# Patient Record
Sex: MALE | Race: WHITE | NOT HISPANIC OR LATINO | Employment: FULL TIME | URBAN - METROPOLITAN AREA
[De-identification: names, ages, dates, MRNs, and addresses within clinical notes are randomized per-mention and may not be internally consistent; named-entity substitution may affect disease eponyms.]

---

## 2017-06-07 ENCOUNTER — OFFICE VISIT - HEALTHEAST (OUTPATIENT)
Dept: INTERNAL MEDICINE | Facility: CLINIC | Age: 52
End: 2017-06-07

## 2017-06-07 DIAGNOSIS — R53.83 FATIGUE: ICD-10-CM

## 2017-06-07 DIAGNOSIS — E11.9 T2DM (TYPE 2 DIABETES MELLITUS) (H): ICD-10-CM

## 2017-06-07 LAB — HBA1C MFR BLD: 7.3 % (ref 3.5–6)

## 2017-06-08 ENCOUNTER — COMMUNICATION - HEALTHEAST (OUTPATIENT)
Dept: INTERNAL MEDICINE | Facility: CLINIC | Age: 52
End: 2017-06-08

## 2017-06-08 DIAGNOSIS — E11.9 TYPE 2 DIABETES MELLITUS NOT AT GOAL (H): ICD-10-CM

## 2017-09-12 ENCOUNTER — OFFICE VISIT - HEALTHEAST (OUTPATIENT)
Dept: INTERNAL MEDICINE | Facility: CLINIC | Age: 52
End: 2017-09-12

## 2017-09-12 DIAGNOSIS — M70.61 TROCHANTERIC BURSITIS OF RIGHT HIP: ICD-10-CM

## 2017-09-12 DIAGNOSIS — M25.551 RIGHT HIP PAIN: ICD-10-CM

## 2017-11-20 ENCOUNTER — RECORDS - HEALTHEAST (OUTPATIENT)
Dept: ADMINISTRATIVE | Facility: OTHER | Age: 52
End: 2017-11-20

## 2018-03-12 ENCOUNTER — OFFICE VISIT - HEALTHEAST (OUTPATIENT)
Dept: FAMILY MEDICINE | Facility: CLINIC | Age: 53
End: 2018-03-12

## 2018-03-12 DIAGNOSIS — M75.51 SUBACROMIAL BURSITIS OF RIGHT SHOULDER JOINT: ICD-10-CM

## 2018-05-16 ENCOUNTER — OFFICE VISIT - HEALTHEAST (OUTPATIENT)
Dept: FAMILY MEDICINE | Facility: CLINIC | Age: 53
End: 2018-05-16

## 2018-05-16 DIAGNOSIS — M25.562 LEFT KNEE PAIN: ICD-10-CM

## 2018-05-23 ENCOUNTER — RECORDS - HEALTHEAST (OUTPATIENT)
Dept: ADMINISTRATIVE | Facility: OTHER | Age: 53
End: 2018-05-23

## 2018-05-30 ENCOUNTER — RECORDS - HEALTHEAST (OUTPATIENT)
Dept: ADMINISTRATIVE | Facility: OTHER | Age: 53
End: 2018-05-30

## 2018-06-05 ENCOUNTER — OFFICE VISIT - HEALTHEAST (OUTPATIENT)
Dept: INTERNAL MEDICINE | Facility: CLINIC | Age: 53
End: 2018-06-05

## 2018-06-05 DIAGNOSIS — Z01.818 PRE-OP EXAMINATION: ICD-10-CM

## 2018-06-05 DIAGNOSIS — M23.201 OLD TEAR OF LATERAL MENISCUS OF LEFT KNEE, UNSPECIFIED TEAR TYPE: ICD-10-CM

## 2018-06-05 DIAGNOSIS — E11.9 TYPE 2 DIABETES MELLITUS NOT AT GOAL (H): ICD-10-CM

## 2018-06-05 LAB
ATRIAL RATE - MUSE: 50 BPM
CREAT UR-MCNC: 107.6 MG/DL
DIASTOLIC BLOOD PRESSURE - MUSE: NORMAL MMHG
HBA1C MFR BLD: 5.5 % (ref 3.5–6)
INTERPRETATION ECG - MUSE: NORMAL
MICROALBUMIN UR-MCNC: <0.5 MG/DL (ref 0–1.99)
MICROALBUMIN/CREAT UR: NORMAL MG/G
P AXIS - MUSE: 28 DEGREES
PR INTERVAL - MUSE: 130 MS
QRS DURATION - MUSE: 100 MS
QT - MUSE: 434 MS
QTC - MUSE: 395 MS
R AXIS - MUSE: 18 DEGREES
SYSTOLIC BLOOD PRESSURE - MUSE: NORMAL MMHG
T AXIS - MUSE: 17 DEGREES
VENTRICULAR RATE- MUSE: 50 BPM

## 2018-06-05 ASSESSMENT — MIFFLIN-ST. JEOR: SCORE: 1765.23

## 2018-06-07 ENCOUNTER — RECORDS - HEALTHEAST (OUTPATIENT)
Dept: ADMINISTRATIVE | Facility: OTHER | Age: 53
End: 2018-06-07

## 2018-06-21 ENCOUNTER — RECORDS - HEALTHEAST (OUTPATIENT)
Dept: ADMINISTRATIVE | Facility: OTHER | Age: 53
End: 2018-06-21

## 2018-07-19 ENCOUNTER — RECORDS - HEALTHEAST (OUTPATIENT)
Dept: ADMINISTRATIVE | Facility: OTHER | Age: 53
End: 2018-07-19

## 2018-09-20 ENCOUNTER — RECORDS - HEALTHEAST (OUTPATIENT)
Dept: ADMINISTRATIVE | Facility: OTHER | Age: 53
End: 2018-09-20

## 2018-10-22 ENCOUNTER — OFFICE VISIT - HEALTHEAST (OUTPATIENT)
Dept: FAMILY MEDICINE | Facility: CLINIC | Age: 53
End: 2018-10-22

## 2018-10-22 DIAGNOSIS — R22.2 MASS ON BACK: ICD-10-CM

## 2018-10-30 ENCOUNTER — OFFICE VISIT - HEALTHEAST (OUTPATIENT)
Dept: FAMILY MEDICINE | Facility: CLINIC | Age: 53
End: 2018-10-30

## 2018-10-30 DIAGNOSIS — L72.3 SEBACEOUS CYST: ICD-10-CM

## 2018-10-30 DIAGNOSIS — E11.9 TYPE 2 DIABETES MELLITUS WITHOUT COMPLICATION, WITHOUT LONG-TERM CURRENT USE OF INSULIN (H): ICD-10-CM

## 2018-11-06 ENCOUNTER — AMBULATORY - HEALTHEAST (OUTPATIENT)
Dept: FAMILY MEDICINE | Facility: CLINIC | Age: 53
End: 2018-11-06

## 2018-11-06 DIAGNOSIS — L72.3 SEBACEOUS CYST: ICD-10-CM

## 2018-11-07 ENCOUNTER — COMMUNICATION - HEALTHEAST (OUTPATIENT)
Dept: FAMILY MEDICINE | Facility: CLINIC | Age: 53
End: 2018-11-07

## 2018-11-08 LAB — BACTERIA SPEC CULT: NO GROWTH

## 2018-11-13 ENCOUNTER — OFFICE VISIT - HEALTHEAST (OUTPATIENT)
Dept: FAMILY MEDICINE | Facility: CLINIC | Age: 53
End: 2018-11-13

## 2018-11-13 ENCOUNTER — COMMUNICATION - HEALTHEAST (OUTPATIENT)
Dept: FAMILY MEDICINE | Facility: CLINIC | Age: 53
End: 2018-11-13

## 2018-11-13 DIAGNOSIS — M89.8X1 CHRONIC SCAPULAR PAIN: ICD-10-CM

## 2018-11-13 DIAGNOSIS — G89.29 CHRONIC SCAPULAR PAIN: ICD-10-CM

## 2018-11-13 DIAGNOSIS — R07.89 CHEST WALL PAIN: ICD-10-CM

## 2018-11-15 ENCOUNTER — COMMUNICATION - HEALTHEAST (OUTPATIENT)
Dept: FAMILY MEDICINE | Facility: CLINIC | Age: 53
End: 2018-11-15

## 2019-02-19 ENCOUNTER — RECORDS - HEALTHEAST (OUTPATIENT)
Dept: ADMINISTRATIVE | Facility: OTHER | Age: 54
End: 2019-02-19

## 2019-03-27 ENCOUNTER — RECORDS - HEALTHEAST (OUTPATIENT)
Dept: ADMINISTRATIVE | Facility: OTHER | Age: 54
End: 2019-03-27

## 2019-04-11 ENCOUNTER — OFFICE VISIT - HEALTHEAST (OUTPATIENT)
Dept: FAMILY MEDICINE | Facility: CLINIC | Age: 54
End: 2019-04-11

## 2019-04-11 ENCOUNTER — COMMUNICATION - HEALTHEAST (OUTPATIENT)
Dept: INTERNAL MEDICINE | Facility: CLINIC | Age: 54
End: 2019-04-11

## 2019-04-11 DIAGNOSIS — E11.9 TYPE 2 DIABETES MELLITUS WITHOUT COMPLICATION, WITHOUT LONG-TERM CURRENT USE OF INSULIN (H): ICD-10-CM

## 2019-04-11 DIAGNOSIS — Z01.818 PRE-OP EXAM: ICD-10-CM

## 2019-04-11 DIAGNOSIS — M25.562 LEFT KNEE PAIN, UNSPECIFIED CHRONICITY: ICD-10-CM

## 2019-04-11 LAB
ANION GAP SERPL CALCULATED.3IONS-SCNC: 11 MMOL/L (ref 5–18)
BUN SERPL-MCNC: 24 MG/DL (ref 8–22)
CALCIUM SERPL-MCNC: 9.5 MG/DL (ref 8.5–10.5)
CHLORIDE BLD-SCNC: 108 MMOL/L (ref 98–107)
CO2 SERPL-SCNC: 24 MMOL/L (ref 22–31)
CREAT SERPL-MCNC: 1.03 MG/DL (ref 0.7–1.3)
CREAT UR-MCNC: 127.3 MG/DL
ERYTHROCYTE [DISTWIDTH] IN BLOOD BY AUTOMATED COUNT: 10.2 % (ref 11–14.5)
GFR SERPL CREATININE-BSD FRML MDRD: >60 ML/MIN/1.73M2
GLUCOSE BLD-MCNC: 74 MG/DL (ref 70–125)
HBA1C MFR BLD: 5.1 % (ref 3.5–6)
HCT VFR BLD AUTO: 44.8 % (ref 40–54)
HGB BLD-MCNC: 15.9 G/DL (ref 14–18)
MCH RBC QN AUTO: 32.6 PG (ref 27–34)
MCHC RBC AUTO-ENTMCNC: 35.4 G/DL (ref 32–36)
MCV RBC AUTO: 92 FL (ref 80–100)
MICROALBUMIN UR-MCNC: 0.61 MG/DL (ref 0–1.99)
MICROALBUMIN/CREAT UR: 4.8 MG/G
PLATELET # BLD AUTO: 196 THOU/UL (ref 140–440)
PMV BLD AUTO: 8.6 FL (ref 7–10)
POTASSIUM BLD-SCNC: 4.1 MMOL/L (ref 3.5–5)
RBC # BLD AUTO: 4.86 MILL/UL (ref 4.4–6.2)
SODIUM SERPL-SCNC: 143 MMOL/L (ref 136–145)
WBC: 12.4 THOU/UL (ref 4–11)

## 2019-04-11 ASSESSMENT — MIFFLIN-ST. JEOR: SCORE: 1694.92

## 2019-04-12 ENCOUNTER — COMMUNICATION - HEALTHEAST (OUTPATIENT)
Dept: FAMILY MEDICINE | Facility: CLINIC | Age: 54
End: 2019-04-12

## 2019-05-02 ENCOUNTER — RECORDS - HEALTHEAST (OUTPATIENT)
Dept: ADMINISTRATIVE | Facility: OTHER | Age: 54
End: 2019-05-02

## 2019-05-16 ENCOUNTER — RECORDS - HEALTHEAST (OUTPATIENT)
Dept: ADMINISTRATIVE | Facility: OTHER | Age: 54
End: 2019-05-16

## 2019-07-09 ENCOUNTER — RECORDS - HEALTHEAST (OUTPATIENT)
Dept: ADMINISTRATIVE | Facility: OTHER | Age: 54
End: 2019-07-09

## 2019-09-24 ENCOUNTER — RECORDS - HEALTHEAST (OUTPATIENT)
Dept: ADMINISTRATIVE | Facility: OTHER | Age: 54
End: 2019-09-24

## 2019-10-15 ENCOUNTER — RECORDS - HEALTHEAST (OUTPATIENT)
Dept: ADMINISTRATIVE | Facility: OTHER | Age: 54
End: 2019-10-15

## 2019-10-23 ENCOUNTER — SURGERY - HEALTHEAST (OUTPATIENT)
Dept: CARDIOLOGY | Facility: CLINIC | Age: 54
End: 2019-10-23

## 2019-10-23 ASSESSMENT — MIFFLIN-ST. JEOR
SCORE: 1765.23
SCORE: 1792.44

## 2019-10-25 ASSESSMENT — MIFFLIN-ST. JEOR: SCORE: 1758.88

## 2019-10-28 ENCOUNTER — COMMUNICATION - HEALTHEAST (OUTPATIENT)
Dept: CARDIOLOGY | Facility: CLINIC | Age: 54
End: 2019-10-28

## 2019-10-28 DIAGNOSIS — I40.1 ACUTE IDIOPATHIC MYOCARDITIS: ICD-10-CM

## 2019-10-28 DIAGNOSIS — I30.0 ACUTE IDIOPATHIC PERICARDITIS: ICD-10-CM

## 2019-11-27 ENCOUNTER — HOSPITAL ENCOUNTER (OUTPATIENT)
Dept: MRI IMAGING | Facility: HOSPITAL | Age: 54
Discharge: HOME | End: 2019-11-27
Attending: INTERNAL MEDICINE

## 2019-11-27 DIAGNOSIS — I40.1 ACUTE IDIOPATHIC MYOCARDITIS: ICD-10-CM

## 2019-11-27 DIAGNOSIS — I30.0 ACUTE IDIOPATHIC PERICARDITIS: ICD-10-CM

## 2019-11-29 LAB
CCTA EJECTION FRACTION: 65 %
CCTA INTERVENTRICULAR SETPUM: 0.9 CM (ref 0.6–1.1)
CCTA LEFT INTERNAL DIMENSION IN SYSTOLE: 3.4 CM (ref 2.1–4)
CCTA LEFT VENTRICULAR INTERNAL DIMENSION IN DIASTOLE: 5.5 CM (ref 3.5–6)
CCTA LEFT VENTRICULAR MASS: 172.72 G
CCTA POSTERIOR WALL: 0.8 CM (ref 0.6–1.1)
MR CARDIAC LEFT VENTRIAL CARDIAC INDEX: 3.5 L/MIN/M2 (ref 1.7–4.2)
MR CARDIAC LEFT VENTRICAL CARDIAC OUTPUT: 7.3 L/MIN (ref 2.8–8.8)
MR CARDIAC LEFT VENTRICULAR DIASTOLIC VOLUME INDEX: 84.85 ML/M2 (ref 47–92)
MR CARDIAC LEFT VENTRICULAR MASS INDEX: 63.4 G/M2 (ref 70–113)
MR CARDIAC LEFT VENTRICULAR MASS: 133 G (ref 118–238)
MR CARDIAC LEFT VENTRICULAR STROKE VOLUME INDEX: 58.15 ML/M2 (ref 32–62)
MR CARDIAC LEFT VENTRICULAR SYSTOLIC VOLUME INDEX: 26.69 ML/M2 (ref 13–30)
MR EJECTION FRACTION: 68.54 %
MR HEIGHT: 1.78 M
MR LEFT VENTRICULAR DYSTOLIC VOLUMEN: 178 ML (ref 77–195)
MR LEFT VENTRICULAR STROKE VOLUMEN: 122 ML (ref 51–133)
MR LEFT VENTRICULAR SYSTOLIC VOLUME: 56 ML (ref 19–72)
MR WEIGHT: 91.8 KG

## 2019-12-02 ENCOUNTER — COMMUNICATION - HEALTHEAST (OUTPATIENT)
Dept: CARDIOLOGY | Facility: CLINIC | Age: 54
End: 2019-12-02

## 2019-12-06 ENCOUNTER — OFFICE VISIT - HEALTHEAST (OUTPATIENT)
Dept: CARDIOLOGY | Facility: CLINIC | Age: 54
End: 2019-12-06

## 2019-12-06 DIAGNOSIS — I31.9 PERICARDITIS: ICD-10-CM

## 2019-12-06 RX ORDER — IBUPROFEN 200 MG
800 TABLET ORAL EVERY 6 HOURS PRN
Status: SHIPPED | COMMUNITY
Start: 2019-12-06

## 2019-12-06 ASSESSMENT — MIFFLIN-ST. JEOR: SCORE: 1766.59

## 2019-12-09 ENCOUNTER — COMMUNICATION - HEALTHEAST (OUTPATIENT)
Dept: FAMILY MEDICINE | Facility: CLINIC | Age: 54
End: 2019-12-09

## 2019-12-11 ENCOUNTER — RECORDS - HEALTHEAST (OUTPATIENT)
Dept: ADMINISTRATIVE | Facility: OTHER | Age: 54
End: 2019-12-11

## 2021-04-27 ENCOUNTER — RECORDS - HEALTHEAST (OUTPATIENT)
Dept: LAB | Facility: CLINIC | Age: 56
End: 2021-04-27

## 2021-04-27 LAB
ALBUMIN SERPL-MCNC: 4.2 G/DL (ref 3.5–5)
ALP SERPL-CCNC: 72 U/L (ref 45–120)
ALT SERPL W P-5'-P-CCNC: 25 U/L (ref 0–45)
ANION GAP SERPL CALCULATED.3IONS-SCNC: 7 MMOL/L (ref 5–18)
AST SERPL W P-5'-P-CCNC: 17 U/L (ref 0–40)
BILIRUB SERPL-MCNC: 0.4 MG/DL (ref 0–1)
BUN SERPL-MCNC: 16 MG/DL (ref 8–22)
CALCIUM SERPL-MCNC: 9.3 MG/DL (ref 8.5–10.5)
CHLORIDE BLD-SCNC: 106 MMOL/L (ref 98–107)
CHOLEST SERPL-MCNC: 215 MG/DL
CO2 SERPL-SCNC: 26 MMOL/L (ref 22–31)
CREAT SERPL-MCNC: 1.04 MG/DL (ref 0.7–1.3)
FASTING STATUS PATIENT QL REPORTED: ABNORMAL
GFR SERPL CREATININE-BSD FRML MDRD: >60 ML/MIN/1.73M2
GLUCOSE BLD-MCNC: 103 MG/DL (ref 70–125)
HDLC SERPL-MCNC: 31 MG/DL
LDLC SERPL CALC-MCNC: 122 MG/DL
POTASSIUM BLD-SCNC: 4 MMOL/L (ref 3.5–5)
PROT SERPL-MCNC: 7 G/DL (ref 6–8)
SODIUM SERPL-SCNC: 139 MMOL/L (ref 136–145)
TRIGL SERPL-MCNC: 310 MG/DL

## 2021-05-25 ENCOUNTER — RECORDS - HEALTHEAST (OUTPATIENT)
Dept: ADMINISTRATIVE | Facility: CLINIC | Age: 56
End: 2021-05-25

## 2021-05-27 NOTE — PROGRESS NOTES
Preoperative Exam    Scheduled Procedure: arthroscopic left knee.  Surgery Date:  05/0282019  Surgery Location: [facility]     Surgeon:  Dr. Lopez    Assessment/Plan:     1. Pre-op exam    - Basic Metabolic Panel; Future  - HM2(CBC w/o Differential); Future  - Glycosylated Hemoglobin A1c  - Microalbumin, Random Urine  - Basic Metabolic Panel  - HM2(CBC w/o Differential)    2. Type 2 diabetes mellitus without complication, without long-term current use of insulin (H)    - Basic Metabolic Panel; Future  - HM2(CBC w/o Differential); Future  - Glycosylated Hemoglobin A1c  - Microalbumin, Random Urine  - Basic Metabolic Panel  - HM2(CBC w/o Differential)    3. Left knee pain, unspecified chronicity    - HM2(CBC w/o Differential); Future  - HM2(CBC w/o Differential)     Surgical Procedure Risk: Low (reported cardiac risk generally < 1%)  Have you had prior anesthesia?: Yes  Have you or any family members had a previous anesthesia reaction:  No  Do you or any family members have a history of a clotting or bleeding disorder?: No  Cardiac Risk Assessment: no increased risk for major cardiac complications    Patient approved for surgery with general or local anesthesia.    Please Note:  none    Functional Status: Independent  Patient plans to recover at home with family.     Past Surgical History:   Procedure Laterality Date     APPENDECTOMY  2012     NE PART REMOVAL COLON W ANASTOMOSIS      Description: Partial Colectomy;  Recorded: 11/06/2013;  Comments: SIGMOID COLECTOMY, OCT 2013     Spontaneous Pnemothroax  1996     Subjective:      Osmel Garay is a 54 y.o. male who presents for a preoperative consultation.  Osmel has had ongoing left knee pain since a slip on the ice this winter.  He tried a steroid injection in the knee but this only lasted a few weeks.  He walks at least 20,000 steps a day between exercise and his 2 jobs.  He does this to keep his blood sugar under good control, he is not currently on any  medications for his diabetes.  Lab Results   Component Value Date    HGBA1C 5.1 04/11/2019     He has been able to lose 50 pounds and his A1c is gone into the normal range.    All other systems reviewed and are negative, other than those listed in the HPI.    Pertinent History  Do you have difficulty breathing or chest pain after walking up a flight of stairs: No  History of obstructive sleep apnea: No  Steroid use in the last 6 months: No  Frequent Aspirin/NSAID use: No  Prior Blood Transfusion: No  Prior Blood Transfusion Reaction: No  If for some reason prior to, during or after the procedure, if it is medically indicated, would you be willing to have a blood transfusion?:  The patient REFUSES transfusion.    Current Outpatient Medications   Medication Sig Dispense Refill     ibuprofen (ADVIL,MOTRIN) 200 MG tablet Take 800 mg by mouth every 8 (eight) hours as needed for pain.       No current facility-administered medications for this visit.         No Known Allergies    Patient Active Problem List   Diagnosis     Seborrheic Keratosis     Shoulder Strain     Joint Pain, Localized In The Shoulder     Esophageal Reflux     Diverticulitis of colon     Attention-deficit Hyperactivity Disorder     Plantar Fasciitis     DM - Type 2 diabetes      Inguinal hernia, right - patient reports diagnosis in ER in March, 2015     Black stools - possible melena     Hematochezia - short-lived and resolved, September, 2016     Epigastric abdominal tenderness - 9/13/16     Adenomatous polyps - seen on colonoscopy of 10/28/16 - repeat colonoscopy on or about 10/28/19.     Dental Pain - seen in ED on 12/4/16       Past Medical History:   Diagnosis Date     ADHD (attention deficit hyperactivity disorder)      Diabetes mellitus (H) 3/25/2015    ER BG >500     Diverticulitis      GERD (gastroesophageal reflux disease)      Pneumonia 2012     Seborrheic keratosis        Past Surgical History:   Procedure Laterality Date     APPENDECTOMY        DE PART REMOVAL COLON W ANASTOMOSIS      Description: Partial Colectomy;  Recorded: 2013;  Comments: SIGMOID COLECTOMY, OCT 2013     Spontaneous Pnemothroax         Social History     Socioeconomic History     Marital status:      Spouse name: Not on file     Number of children: Not on file     Years of education: Not on file     Highest education level: Not on file   Occupational History     Occupation: Computer work     Employer: [employer]   Social Needs     Financial resource strain: Not on file     Food insecurity:     Worry: Not on file     Inability: Not on file     Transportation needs:     Medical: Not on file     Non-medical: Not on file   Tobacco Use     Smoking status: Current Every Day Smoker     Packs/day: 0.50     Last attempt to quit: 2016     Years since quittin.4     Smokeless tobacco: Never Used   Substance and Sexual Activity     Alcohol use: Yes     Alcohol/week: 0.6 oz     Types: 1 Shots of liquor per week     Drug use: No     Sexual activity: Yes     Partners: Female   Lifestyle     Physical activity:     Days per week: Not on file     Minutes per session: Not on file     Stress: Not on file   Relationships     Social connections:     Talks on phone: Not on file     Gets together: Not on file     Attends [employer] service: Not on file     Active member of club or organization: Not on file     Attends meetings of clubs or organizations: Not on file     Relationship status: Not on file     Intimate partner violence:     Fear of current or ex partner: Not on file     Emotionally abused: Not on file     Physically abused: Not on file     Forced sexual activity: Not on file   Other Topics Concern     Not on file   Social History Narrative    .       Patient Care Team:  Jessica Euceda PA-C as PCP - General (Physician Assistant)          Objective:     Vitals:    19 1254   BP: 110/58   Pulse: 73   Temp: 98.2  F (36.8  C)   TempSrc: Oral  "  SpO2: 97%   Weight: 188 lb 3.2 oz (85.4 kg)   Height: 5' 10\" (1.778 m)         Physical Exam:  Alert, cooperative, well-hydrated.  Appears well.  Eyes: Pupils equal, round, reactive to light.  HEENT: Sclera white, nares patent, MMM   Lungs: Clear to auscultation. No retractions, no increased work of respiration, equal chest rise.   Heart: Regular rate and rhythm, no murmurs, clicks,    Gallops.  Abdomen: Soft, bowel sounds in 4 quadrants with no tenderness to palpation, no organomegaly or masses, no aortic or renal bruits.  Extremities: no tenderness to palpation of gastrocnemius, bilaterally.  Skin: no increased warmth, edema, or erythema of lower legs bilaterally.  Back:  No cervical, thoracic or lumbar tenderness to spinous processes or musculature.  Neuro: pupils equal and reactive to light bilaterally, CN II - XII  intact. No focal motor/sensory deficits.Rhomberg negative. M/S 5/5 all extremities. DTR 2/4 all extremities    Patient Instructions   Avoid NSAIDS until after surgery.        Labs:  Recent Results (from the past 24 hour(s))   Glycosylated Hemoglobin A1c    Collection Time: 04/11/19  1:37 PM   Result Value Ref Range    Hemoglobin A1c 5.1 3.5 - 6.0 %   HM2(CBC w/o Differential)    Collection Time: 04/11/19  1:37 PM   Result Value Ref Range    WBC 12.4 (H) 4.0 - 11.0 thou/uL    RBC 4.86 4.40 - 6.20 mill/uL    Hemoglobin 15.9 14.0 - 18.0 g/dL    Hematocrit 44.8 40.0 - 54.0 %    MCV 92 80 - 100 fL    MCH 32.6 27.0 - 34.0 pg    MCHC 35.4 32.0 - 36.0 g/dL    RDW 10.2 (L) 11.0 - 14.5 %    Platelets 196 140 - 440 thou/uL    MPV 8.6 7.0 - 10.0 fL       Immunization History   Administered Date(s) Administered     DT (pediatric) 04/12/2004     Td, Adult, Absorbed 04/12/2004           Electronically signed by Jessica Euceda PA-C 04/11/19 12:58 PM  "

## 2021-05-27 NOTE — PROGRESS NOTES
Labs are normal and acceptable ranges, please call results to the patient or send a letter if not reachable by phone.

## 2021-05-30 ENCOUNTER — RECORDS - HEALTHEAST (OUTPATIENT)
Dept: ADMINISTRATIVE | Facility: CLINIC | Age: 56
End: 2021-05-30

## 2021-05-31 ENCOUNTER — RECORDS - HEALTHEAST (OUTPATIENT)
Dept: ADMINISTRATIVE | Facility: CLINIC | Age: 56
End: 2021-05-31

## 2021-05-31 VITALS — WEIGHT: 226.3 LBS | BODY MASS INDEX: 32.58 KG/M2

## 2021-05-31 VITALS — BODY MASS INDEX: 32.74 KG/M2 | WEIGHT: 227.4 LBS

## 2021-06-01 VITALS — BODY MASS INDEX: 31.86 KG/M2 | WEIGHT: 221.3 LBS

## 2021-06-01 VITALS — WEIGHT: 209 LBS | BODY MASS INDEX: 30.09 KG/M2

## 2021-06-01 VITALS — WEIGHT: 203.7 LBS | BODY MASS INDEX: 29.16 KG/M2 | HEIGHT: 70 IN

## 2021-06-02 ENCOUNTER — RECORDS - HEALTHEAST (OUTPATIENT)
Dept: ADMINISTRATIVE | Facility: CLINIC | Age: 56
End: 2021-06-02

## 2021-06-02 VITALS — BODY MASS INDEX: 26.37 KG/M2 | WEIGHT: 183.8 LBS

## 2021-06-02 VITALS — BODY MASS INDEX: 26.43 KG/M2 | WEIGHT: 184.2 LBS

## 2021-06-02 VITALS — BODY MASS INDEX: 25.97 KG/M2 | WEIGHT: 181 LBS

## 2021-06-02 VITALS — WEIGHT: 188.2 LBS | BODY MASS INDEX: 26.94 KG/M2 | HEIGHT: 70 IN

## 2021-06-02 VITALS — BODY MASS INDEX: 25.76 KG/M2 | WEIGHT: 179.5 LBS

## 2021-06-02 NOTE — TELEPHONE ENCOUNTER
----- Message from Ann Chambers MD sent at 10/25/2019 11:31 AM CDT -----  Patient of Dr. Reed's that I am seen in sending home today from the hospital.  I presume has pericarditis as well as myocarditis.  Would like MRI at [facility] to look for infiltrative myocardititis.  Can you please order?LF    Called patient and he is not claustrophobic. Order placed and he was given central scheduling's number and updated that they will reach out to him to arrange. -[facility]

## 2021-06-03 ENCOUNTER — RECORDS - HEALTHEAST (OUTPATIENT)
Dept: ADMINISTRATIVE | Facility: CLINIC | Age: 56
End: 2021-06-03

## 2021-06-03 VITALS — HEIGHT: 70 IN | WEIGHT: 202.3 LBS | BODY MASS INDEX: 28.96 KG/M2

## 2021-06-04 VITALS
HEART RATE: 72 BPM | HEIGHT: 70 IN | WEIGHT: 204 LBS | RESPIRATION RATE: 16 BRPM | BODY MASS INDEX: 29.2 KG/M2 | DIASTOLIC BLOOD PRESSURE: 70 MMHG | SYSTOLIC BLOOD PRESSURE: 112 MMHG

## 2021-06-11 NOTE — PROGRESS NOTES
[facility] Clinic Note  Patient Name: Osmel Garay  Patient Age: 52 y.o.  YOB: 1965  MRN: [id number]  ?  Date of Visit: 6/7/2017  Reason for Office Visit:   Chief Complaint   Patient presents with     Fatigue     not able sleep x 1/yr     HPI: Osmel Garay 52 y.o. who presents to clinic for fatigue, feeling tired throughout the day. Hard time focusing, especially at work. Sits a computer all day. Weight is stable.     Does not have a problem falling asleep but does not feel refreshed when waking up. He had a sleep study and does not have LE.     Weight is stable, gained some. Diet is poor eats a lot of carbs. Does not exercise. 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 Financial stress right now with mortgage. Does not feel depressed.     He does continue to smoke. Does not use illicit drugs and drinks Etoh on occasion       Review of Systems: As noted in HPI     Current Scheduled Meds:  Outpatient Encounter Prescriptions as of 6/7/2017   Medication Sig Dispense Refill     aspirin 81 MG EC tablet Take 1 tablet (81 mg total) by mouth daily. 81 MG PO DAILY 100 tablet 3     blood sugar diagnostic Strp Use 1 strip As Directed 2 (two) times a day. 200 strip 1     blood sugar diagnostic Strp Test three times daily 100 strip 3     cholecalciferol, vitamin D3, 5,000 unit Tab Take 5,000 Units by mouth daily.       HYDROcodone-acetaminophen 5-325 mg per tablet Take 1 tablet by mouth every 6 (six) hours as needed. 10 tablet 0     ibuprofen (ADVIL,MOTRIN) 200 MG tablet Take 800 mg by mouth every 8 (eight) hours as needed for pain.       lancets (ACCU-CHEK FASTCLIX) Misc TEST BLOOD SUGARS  each 1     metFORMIN (GLUCOPHAGE) 500 MG tablet Take 1 tablet (500 mg total) by mouth 2 (two) times a day with meals. 180 tablet 1     omeprazole (PRILOSEC) 20 MG capsule Take 20 mg by mouth daily.       No facility-administered encounter medications on file as of 6/7/2017.        Objective /  Physical Examination:  /76 (Patient Site: Left Arm, Patient Position: Sitting, Cuff Size: Adult Large)  Pulse 79  Wt (!) 227 lb 6.4 oz (103.1 kg)  SpO2 97%  BMI 32.74 kg/m2  Wt Readings from Last 3 Encounters:   06/07/17 (!) 227 lb 6.4 oz (103.1 kg)   11/23/16 (!) 227 lb 14.4 oz (103.4 kg)   10/11/16 (!) 226 lb (102.5 kg)     Body mass index is 32.74 kg/(m^2). (>25?)    General Appearance: Alert and oriented in no acute distress  Head: sinuses NT  Ears: Tympanic membrane clear with landmarks well visualized bilaterally  Eyes: Conjunctivae clear and sclerae non-icteric  Nose: Septum midline, nares patent, no visible polyps, mucosa moist and without drainage  Throat:  pharynx without erythema or exudate  Neck:. No Thyromegaly   Lungs: Clear to auscultation bilaterally. Normal inspiratory and expiratory effort. No w/r/r  Cardiovascular: RRR S1, S2. No m/r/g  Integumentary: Warm and dry.  Neuro: Alert and oriented, follows commands appropriately. Grossly normal.     Assessment / Plan / Medical Decision Making:      Encounter Diagnoses   Name Primary?     Fatigue Yes     T2DM (type 2 diabetes mellitus)         1. Fatigue    Suspect this is multifactorial in the setting of poor diet, lack of physical activity, and stress  Will check some basic labs  Spent the majority of the visit encouraging healthier diet (cut down on sugars) more vegetables and exercise!   Stress reduction important as well. Meditation can help     - Vitamin D, Total (25-Hydroxy)  - Comprehensive Metabolic Panel  - Hemoglobin  - Thyroid Stimulating Hormone (TSH)  - Vitamin B12    2. T2DM (type 2 diabetes mellitus)  Will recheck A1c. Well controlled at last check   - Glycosylated Hemoglobin A1c    Follow up in 3 months or sooner to establish care     Will follow up labs     Total time spent with patient was 15 minutes with >50% of time spent in face-to-face counseling regarding the above plan     Juan Avalos MD  [facility]  Medicine

## 2021-06-12 NOTE — PROGRESS NOTES
[facility] Clinic Note  Patient Name: Osmel Garay  Patient Age: 52 y.o.  YOB: 1965  MRN: [id number]  ?  Date of Visit: 9/12/2017  Reason for Office Visit:   Chief Complaint   Patient presents with     Hip Pain     R hip pain sore for a while especially when asleep, and bottom L foot pain     HPI: Osmel Garay 52 y.o. male who presents to clinic for chronic right hip pain x 1 year, worse at night when sleeping, lateral right hip pain. Last few weeks has been getting worse, now has started to bother him while walking. No injuries, trauma. Pain described as constant ache. Takes ibuprofen every once a while does give some relief.     Review of Systems: As noted in HPI     Current Scheduled Meds:  Outpatient Encounter Prescriptions as of 9/12/2017   Medication Sig Dispense Refill     aspirin 81 MG EC tablet Take 1 tablet (81 mg total) by mouth daily. 81 MG PO DAILY 100 tablet 3     blood glucose test strips Use 200 each As Directed 2 (two) times a day. 200 strip 0     blood sugar diagnostic Strp Test three times daily 100 strip 3     generic lancets (ACCU-CHEK FASTCLIX) TEST BLOOD SUGARS  each 1     ibuprofen (ADVIL,MOTRIN) 200 MG tablet Take 800 mg by mouth every 8 (eight) hours as needed for pain.       metFORMIN (GLUCOPHAGE) 1000 MG tablet Take 1 tablet (1,000 mg total) by mouth 2 (two) times a day with meals. 60 tablet 3     omeprazole (PRILOSEC) 20 MG capsule Take 20 mg by mouth daily.       cholecalciferol, vitamin D3, 5,000 unit Tab Take 5,000 Units by mouth daily.       HYDROcodone-acetaminophen 5-325 mg per tablet Take 1 tablet by mouth every 6 (six) hours as needed. 10 tablet 0     No facility-administered encounter medications on file as of 9/12/2017.        Objective / Physical Examination:  /70  Pulse 76  Wt (!) 226 lb 4.8 oz (102.6 kg)  BMI 32.58 kg/m2  Wt Readings from Last 3 Encounters:   09/12/17 (!) 226 lb 4.8 oz (102.6 kg)   06/07/17 (!) 227 lb 6.4 oz (103.1 kg)    11/23/16 (!) 227 lb 14.4 oz (103.4 kg)     Body mass index is 32.58 kg/(m^2). (>25?)    General Appearance: Alert and oriented in no acute distress  Extremities: right hip focal tenderness over greater trochanter, flex/ext, internal/external rotation at baseline, pain minimal. strength equal throughout.  Integumentary: Warm and dry. Without suspicious looking lesions  Neuro: Alert and oriented, follows commands appropriately. Gait normal     Assessment / Plan / Medical Decision Making:      Encounter Diagnoses   Name Primary?     Right hip pain Yes     Trochanteric bursitis of right hip         1. Right hip pain  2. Trochanteric bursitis of right hip    Procedure:  Right trochanteric bursa injection.    Diagnosis:  Trochanteric bursitis.    Description of procedure:  Patient was given informed consent prior to proceeding with injection.  Patient agreed to proceed knowing the risks and benefits.  Patient was placed in a lateral decubitus position with affected hip exposed.  The superior, anterior and posterior aspects of the upper femur were marked.  About 1.5 inches below the superior aspect and midway between the anterior and posterior aspects, an injection spot was found.  Palpation reproduced the tenderness.  Using a 25G needle, a combination of 40 mg of Kenalog and 1 cc of lidocaine with epinephrine was injected slightly away from the bone.  Aftercare instructions were given and there were no immediate complications.    -Also advised on various exercises and stretches. Can repeat injection in another 3 months if needed    Total time spent with patient was 15 minutes with >50% of time spent in face-to-face counseling regarding the above plan     Juan Avalos MD  [facility]

## 2021-06-16 PROBLEM — R07.9 CHEST PAIN: Status: ACTIVE | Noted: 2019-10-25

## 2021-06-16 PROBLEM — I40.1 ACUTE IDIOPATHIC MYOCARDITIS: Status: ACTIVE | Noted: 2019-10-25

## 2021-06-18 NOTE — PROGRESS NOTES
Subjective:      Patient ID: Osmel Garay is a 53 y.o. male.    Chief Complaint:    HPI Osmel Garay is a 53 y.o. male who presents today complaining of left knee pain. Patient states that he slipped while walking in March. He had been able to walk on it fine without a lot of pain as long as he was not twisting it. It has slowly been getting worse, and today it got much worse. He went to [facility] walk today, then he went to [employer] and he went to put his shorts on and had a sudden pain when he tried to bend it.  He has previously had surgery on this knee for a torn meniscus about 20 years ago. His pain is mostly lateral and it is TTP. He has been intermediately taking Ibuprofen every once in a while. He has not noticed any swelling or redness. He now has some pain with flexion and putting external rotation. He has not been wearing any braces or icing lately because the pain has been manageable.     Social History   Substance Use Topics     Smoking status: Former Smoker     Packs/day: 0.50     Quit date: 11/13/2016     Smokeless tobacco: Never Used     Alcohol use 0.6 oz/week     1 Shots of liquor per week       Review of Systems   Musculoskeletal: Positive for arthralgias (left knee pain) and gait problem. Negative for joint swelling.   Skin: Negative for color change.       Objective:     /62 (Patient Site: Right Arm, Patient Position: Sitting, Cuff Size: Adult Regular)  Pulse 76  Temp 98.1  F (36.7  C) (Oral)   Resp 16  Wt 209 lb (94.8 kg)  SpO2 96%  BMI 30.09 kg/m2    Physical Exam   Constitutional: He appears well-developed and well-nourished. No distress.   HENT:   Head: Normocephalic and atraumatic.   Right Ear: External ear normal.   Left Ear: External ear normal.   Eyes: Conjunctivae are normal.   Cardiovascular: Normal rate, regular rhythm and normal heart sounds.  Exam reveals no gallop and no friction rub.    No murmur heard.  Pulmonary/Chest: Effort normal and breath sounds normal. No  respiratory distress. He has no wheezes. He has no rales.   Musculoskeletal:        Left knee: He exhibits decreased range of motion. He exhibits no swelling, no effusion, no deformity, no laceration and no erythema. Tenderness found. Lateral joint line and LCL tenderness noted.   Physical exam limited due to pain.    Skin: He is not diaphoretic.   Psychiatric: He has a normal mood and affect. His behavior is normal. Judgment and thought content normal.   Nursing note and vitals reviewed.      Radiology:  I have personally ordered and preliminarily reviewed the following xray, I have noted no signs of fracture or dislocation or arthritis.   EXAM DATE: 05/16/2018  [facility]  X-RAY KNEE, LEFT, 3 VIEWS  5/16/2018 7:15 PM  INDICATION: lateral knee pain. Had slip injury 2 months ago. N  COMPARISON: None.  FINDINGS: Negative knee. No fracture or dislocation. No joint effusion.    Clinical Decision Making:  Xray r/o fracture and dislocation. Suspect possible ligamental or meniscal damage considering hx of meniscal tear. Patients knee was to painful for examination. Referral for ortho was placed. Patient already has crutches and knee immobilizer at home.     Assessment:     Procedures    1. Left knee pain  XR Knee Left Plus [place] VW    Ambulatory referral to Orthopedics         Patient Instructions   1) Ibuprofen 600 mg three times daily with food for 10 days, then as needed.  2) Ice to the affected area 20 minutes three times daily.  3) Use crutches and knee immobilizer to walk.   4) Follow up in 10-14 days if not improving, sooner if worsening.  5) I've placed a referral for ortho. Follow up this week for further evaluation. If you use [facility] they have access to our images.

## 2021-06-18 NOTE — PROGRESS NOTES
"Preoperative H&P    Surgeon: Dr Romero  Date Of Surgery: 06/07/18 at [facility]   Procedure: left knee arthroscopic surgery to repair lateral meniscus     History of Present Illness:  Osmel Garay is a 53 y.o.  male with a history of well controlled T2DM who presents to the office today for a preoperative consultation at the request of Dr. Romero for left knee arthroscopy. History of old lateral meniscus tear.     There is no history of anesthesia issues in the past. Denies history or recent abnormal bleeding     Pertinent History  Prior Anesthesia: yes  Previous Anesthesia Reaction:  no  Diabetes: yes, repeat A1c today   Cardiovascular Disease: no  Pulmonary Disease: no  Renal Disease: no  GI Disease: no  Sleep Apnea: no  Thromboembolic Problems: no  Clotting Disorder: no  Bleeding Disorder: no  Transfusion Reaction: no  Impaired Immunity: no  Steroid use in the last 6 months: no  Frequent Aspirin use: yes, will continue as per recs from surgeon      Family history: There is no family history of abnormal reaction to anesthesia or sudden unexplained death    Review of Systems:   CV: Chest pain- not noted. Shortness of breath- not noted. Edema- not noted. HILL- not noted. Orthopnea- not noted.   GI: Abdominal pain- not noted. Nausea\vomiting\diarrhea-not noted. Melena or hematochezia- not noted.   : Urgency-not noted. Frequency- not noted. Dysuria- not noted. Hematuria- not noted. Incontinence- not noted.   CNS: Headaches- no significant symptoms. Dizziness- not noted. Visual Changes- not noted.     Physical Examination:    /62 (Patient Site: Right Arm, Patient Position: Sitting, Cuff Size: Adult Regular)  Pulse (!) 52  Temp 97.7  F (36.5  C) (Oral)   Ht 5' 10\" (1.778 m)  Wt 203 lb 11.2 oz (92.4 kg)  BMI 29.23 kg/m2  Wt Readings from Last 3 Encounters:   06/05/18 203 lb 11.2 oz (92.4 kg)   05/16/18 209 lb (94.8 kg)   03/12/18 221 lb 4.8 oz (100.4 kg)     Body mass index is 29.23 " kg/(m^2). (>25?)    GEN: alert, cooperative, no acute distress  ENT: NCAT. External Canals: within normal limits.   Oropharynx: moist, no lesions, clear. Eyes: Conjunctiva: normal.   Neck: Supple, no adenopathy, no jvd, no abnormal masses.  Chest: Within normal limits.  Cardiac: RRR, no rubs, no gallops.  Lungs: Breath Sounds normal, no crackles, no wheezing, no rhonchi.   Abd: soft, bowel sounds normal, no tenderness.  Extr: Warm, no edema.   Skin: No rashes    Outpatient Encounter Prescriptions as of 6/5/2018   Medication Sig Dispense Refill     aspirin 81 MG EC tablet Take 1 tablet (81 mg total) by mouth daily. 81 MG PO DAILY 100 tablet 3     ibuprofen (ADVIL,MOTRIN) 200 MG tablet Take 800 mg by mouth every 8 (eight) hours as needed for pain.       omeprazole (PRILOSEC) 20 MG capsule Take 20 mg by mouth daily.       blood glucose test strips Use 200 each As Directed 2 (two) times a day. 200 strip 0     blood sugar diagnostic Strp Test three times daily 100 strip 3     cholecalciferol, vitamin D3, 5,000 unit Tab Take 5,000 Units by mouth daily.       generic lancets (ACCU-CHEK FASTCLIX) TEST BLOOD SUGARS  each 1     HYDROcodone-acetaminophen 5-325 mg per tablet Take 1 tablet by mouth every 6 (six) hours as needed. 10 tablet 0     [DISCONTINUED] metFORMIN (GLUCOPHAGE) 1000 MG tablet Take 1 tablet (1,000 mg total) by mouth 2 (two) times a day with meals. 60 tablet 3     No facility-administered encounter medications on file as of 6/5/2018.      Past Medical History:   Diagnosis Date     ADHD (attention deficit hyperactivity disorder)      Diabetes mellitus 3/25/2015    ER BG >500     Diverticulitis      GERD (gastroesophageal reflux disease)      Pneumonia 2012     Seborrheic keratosis      Past Surgical History:   Procedure Laterality Date     APPENDECTOMY  2012     DC PART REMOVAL COLON W ANASTOMOSIS      Description: Partial Colectomy;  Recorded: 11/06/2013;  Comments: SIGMOID COLECTOMY, OCT 2013      Spontaneous Pnemothroax  1996     Social History   Substance Use Topics     Smoking status: Current Every Day Smoker     Packs/day: 2-3 cigarettes per day      Last attempt to quit: 11/13/2016     Smokeless tobacco: Never Used     Alcohol use 0.6 oz/week     1 Shots of liquor per week     No Known Allergies    Diagnoses:     Encounter Diagnoses   Name Primary?     Pre-op examination Yes     Old tear of lateral meniscus of left knee, unspecified tear type      DM - Type 2 diabetes          1. Pre-op examination  2. Old tear of lateral meniscus of left knee, unspecified tear type  3. DM - Type 2 diabetes   Continue diet and exercise. Recheck a1c and micro albumin today   - Glycosylated Hemoglobin A1c  - Microalbumin, Random Urine    Discussion-Plan:     Labs: will check A1c today. No other labs required for pre op     EKG 6/5/18:  Sinus bradycardia   Otherwise normal ECG   When compared with ECG of 04-NOV-2015 17:04,   No significant change was found     Prophylaxis for cardiac events with perioperative beta-blockers: clinical risk factors  not indicated.  can take daily meds with a sip of water morning of surgery   Advised to stop smoking for at least 24 hours prior to surgery     Cardiac Risk Estimation: RCRI for planned surgery is < 1 %    Clinical Risk Factors:     -Cardiac: No recent hx of MI, valvular disease, CHF or abnormal EKG     -Metabolic/Endocrine: history of diet controlled T2DM, no history of CKD stage 3 or above    -Neurologic: no hx of CVA    Functional Capacity:     > 4 mets: able to climb stairs and walk 1-2 blocks w/o stopping    Presently Clinically Stable for Scheduled Surgery. No contraindications to planned surgery and difficulty with intubation is not anticipated    Juan Avalos MD

## 2021-06-19 NOTE — LETTER
Letter by Jessica Euceda PA-C at      Author: Jessica Euceda PA-C Service: -- Author Type: --    Filed:  Encounter Date: 4/12/2019 Status: (Other)         Osmel Garay  [address] [place] [address]             April 12, 2019         Dear Mr. Garay,    Below are the results from your recent visit:    Resulted Orders   Glycosylated Hemoglobin A1c   Result Value Ref Range    Hemoglobin A1c 5.1 3.5 - 6.0 %   Microalbumin, Random Urine   Result Value Ref Range    Microalbumin, Random Urine 0.61 0.00 - 1.99 mg/dL    Creatinine, Urine 127.3 mg/dL    Microalbumin/Creatinine Ratio Random Urine 4.8 <=19.9 mg/g    Narrative    Microalbumin, Random Urine  <2.0 mg/dL . . . . . . . . Normal  3.0-30.0 mg/dL . . . . . . Microalbuminuria  >30.0 mg/dL . . . . . .  . Clinical Proteinuria  Microalbumin/Creatinine Ratio, Random Urine  <20 mg/g . . . . .. . . . Normal   mg/g . . . . . . . Microalbuminuria  >300 mg/g . . . . . . . . Clinical Proteinuria   Basic Metabolic Panel   Result Value Ref Range    Sodium 143 136 - 145 mmol/L    Potassium 4.1 3.5 - 5.0 mmol/L    Chloride 108 (H) 98 - 107 mmol/L    CO2 24 22 - 31 mmol/L    Anion Gap, Calculation 11 5 - 18 mmol/L    Glucose 74 70 - 125 mg/dL    Calcium 9.5 8.5 - 10.5 mg/dL    BUN 24 (H) 8 - 22 mg/dL    Creatinine 1.03 0.70 - 1.30 mg/dL    GFR MDRD Af Amer >60 >60 mL/min/1.73m2    GFR MDRD Non Af Amer >60 >60 mL/min/1.73m2    Narrative    Fasting Glucose reference range is 70-99 mg/dL per  American Diabetes Association (ADA) guidelines.   HM2(CBC w/o Differential)   Result Value Ref Range    WBC 12.4 (H) 4.0 - 11.0 thou/uL    RBC 4.86 4.40 - 6.20 mill/uL    Hemoglobin 15.9 14.0 - 18.0 g/dL    Hematocrit 44.8 40.0 - 54.0 %    MCV 92 80 - 100 fL    MCH 32.6 27.0 - 34.0 pg    MCHC 35.4 32.0 - 36.0 g/dL    RDW 10.2 (L) 11.0 - 14.5 %    Platelets 196 140 - 440 thou/uL    MPV 8.6 7.0 - 10.0 fL        Labs are normal.    Please call with questions or contact us  using [employer].    Sincerely,        Electronically signed by Jessica Euceda PA-C

## 2021-06-21 NOTE — PROGRESS NOTES
HPI:  Osmel Garay is a 53 y.o. male who is seen for establishing care as a diabetic and new swollen lump on his upper back.  Diagnosed with diabetes 5 years ago when he went to the ER for an acute injury.  Found to have a blood sugar over 500 at that time.  Admitted and was in the hospital for a couple of days, was sent home on oral diabetes medication.  Has some episodes of very low blood sugars and had to slowly wean off of oral medications because of his weight loss and his diet changes.  He walks sometimes miles a day.  He has also changed his diet to mostly vegetables.  His last A1c was 5.5.  He has had a swollen lump on this back for about 1 week.  To the urgent care and was advised to follow-up in our office.  Did not have antibiotics and did not have a procedure to [place] or drain this lump.  It has slowly gotten smaller over the last few days.  Originally it was a little warm and red he denies history of MRSA, abscesses.    Chief Complaint   Patient presents with     Establish Care     Follow-up     [facility], 10/22   ROS: Patient denies fever, chills, sweats, fainting, fatigue,chest pain, palpitations, shortness of breath, wheezing, cough, skin rash, increased warmth, redness.    Lab Results   Component Value Date    HGBA1C 5.5 06/05/2018    HGBA1C 7.3 (H) 06/07/2017    HGBA1C 5.5 08/29/2016     Lab Results   Component Value Date    MICROALBUR <0.50 06/05/2018    LDLCALC 120 08/29/2016    CREATININE 1.03 06/07/2017     Patient Active Problem List   Diagnosis     Seborrheic Keratosis     Shoulder Strain     Joint Pain, Localized In The Shoulder     Esophageal Reflux     Diverticulitis of colon     Attention-deficit Hyperactivity Disorder     Plantar Fasciitis     DM - Type 2 diabetes      Inguinal hernia, right - patient reports diagnosis in ER in March, 2015     Black stools - possible melena     Hematochezia - short-lived and resolved, September, 2016     Epigastric abdominal tenderness - 9/13/16      Adenomatous polyps - seen on colonoscopy of 10/28/16 - repeat colonoscopy on or about 10/28/19.     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     Past Surgical History:   Procedure Laterality Date     APPENDECTOMY  2012     DE PART REMOVAL COLON W ANASTOMOSIS      Description: Partial Colectomy;  Recorded: 11/06/2013;  Comments: SIGMOID COLECTOMY, OCT 2013     Spontaneous Pnemothroax  1996     Current Outpatient Prescriptions on File Prior to Visit   Medication Sig Dispense Refill     ibuprofen (ADVIL,MOTRIN) 200 MG tablet Take 800 mg by mouth every 8 (eight) hours as needed for pain.       MELOXICAM ORAL Take by mouth.       aspirin 81 MG EC tablet Take 1 tablet (81 mg total) by mouth daily. 81 MG PO DAILY 100 tablet 3     blood glucose test strips Use 200 each As Directed 2 (two) times a day. 200 strip 0     blood sugar diagnostic Strp Test three times daily 100 strip 3     cholecalciferol, vitamin D3, 5,000 unit Tab Take 5,000 Units by mouth daily.       generic lancets (ACCU-CHEK FASTCLIX)  TEST BLOOD SUGARS  each 1     HYDROcodone-acetaminophen 5-325 mg per tablet Take 1 tablet by mouth every 6 (six) hours as needed. 10 tablet 0     omeprazole (PRILOSEC) 20 MG capsule Take 20 mg by mouth daily.       No current facility-administered medications on file prior to visit.      No Known Allergies    I have reviewed the patient's medical history in detail and updated the computerized patient record.  OBJECTIVE:  Wt Readings from Last 3 Encounters:   10/30/18 184 lb 3.2 oz (83.6 kg)   10/22/18 183 lb 12.8 oz (83.4 kg)   06/05/18 203 lb 11.2 oz (92.4 kg)     Temp Readings from Last 3 Encounters:   10/30/18 98.1  F (36.7  C) (Oral)   10/22/18 97.8  F (36.6  C) (Oral)   06/05/18 97.7  F (36.5  C) (Oral)     BP Readings from Last 3 Encounters:   10/30/18 114/60   10/22/18 122/60   06/05/18 100/62     Pulse Readings from Last 3 Encounters:   10/30/18 (!) 56   10/22/18 (!) 55   06/05/18 (!) 52     Body mass index is 26.43 kg/(m^2).     Alert, cooperative, well-hydrated. Appears well.  Eyes: Pupils equal, round, reactive to light.  HEENT: Sclera white, nares patent, MMM.  Lungs: Clear to auscultation. No retractions, no increased work of respiration, equal chest rise.   Heart: Regular rate and rhythm, no murmurs, clicks,   Gallops.  Back: No cervical, thoracic or lumbar tenderness to spinous processes or musculature.  Slight left scoliosis of the upper spine.  Skin: Tender cyst approximately 2 cm in diameter located on right thoracic back about T8 near scapula.  Central pore, no exudates, fluctuant, increased warmth or edema.  Labs:  No visits with results within 3 Month(s) from this visit.  Latest known visit with results is:    Physical on 06/05/2018   Component Date Value     VENTRICULAR RATE 06/05/2018 50      ATRIAL RATE 06/05/2018 50      P-R INTERVAL 06/05/2018 130      QRS DURATION 06/05/2018 100      Q-T INTERVAL 06/05/2018 434      QTC CALCULATION (BEZET) 06/05/2018 395      P [place] 06/05/2018 28       R AXIS 06/05/2018 18      T AXIS 06/05/2018 17      MUSE DIAGNOSIS 06/05/2018                      Value:Sinus bradycardia  Otherwise normal ECG  When compared with ECG of 04-NOV-2015 17:04,  No significant change was found  Confirmed by TAMAR JETT MD LOC:JN ([id number]) on 6/5/2018 2:51:25 PM       Hemoglobin A1c 06/05/2018 5.5      Microalbumin, Random Uri* 06/05/2018 <0.50      Creatinine, Urine 06/05/2018 107.6      Microalbumin/Creatinine * 06/05/2018       ASSESMENT/PLAN:  1. Sebaceous cyst     2. Type 2 diabetes mellitus without complication, without long-term current use of insulin (H)  Microalbumin, Random Urine    Glycosylated Hemoglobin A1c   Advised to continue regular exercise for his diabetes, continue good diet rich in vegetables.  Follow-up in December for A1c and microalbumin.  Return next week for sebaceous cyst removal.  Discussed the nature of the cysts and answered all his questions.  Jessica Euceda, MS, PA-C 10/30/18

## 2021-06-21 NOTE — PROGRESS NOTES
HPI:  Osmel Garay is a 53 y.o. male who is seen for diabetes, has torn meniscus left and had a scope this year. History of bursitis of the hip.   New lump on his back that is red and swollen, right side of the upper spine, pain with pressure, no oozing, No MRSA history or abcesses. NO fever, nausea, fatigue that is new.   Chief Complaint   Patient presents with     [facility]     Follow-up     [facility], 10/22   .  Patient denies chest pain, palpitations, shortness of breath, wheezing, cough, neck pain, back pain, dysuria, flank pain, abdominal pain, nausea, vomiting, diarrhea, constipation, black or bloody stools, acid reflux, lower leg edema, claudication, muscle weakness, dizziness, headaches, change in vision, changes in hearing, tinnitus, nasal congestion, fever, weight loss, globus, dysphagia, increased urination, increased thirst, depression, anxiety.  Lab Results   Component Value Date    HGBA1C 5.5 06/05/2018    HGBA1C 7.3 (H) 06/07/2017    HGBA1C 5.5 08/29/2016     Lab Results   Component Value Date    MICROALBUR <0.50 06/05/2018    LDLCALC 120 08/29/2016    CREATININE 1.03 06/07/2017     Patient Active Problem List   Diagnosis     Seborrheic Keratosis     Shoulder Strain     Joint Pain, Localized In The Shoulder     Esophageal Reflux     Diverticulitis of colon     Attention-deficit Hyperactivity Disorder     Plantar Fasciitis     DM - Type 2 diabetes      Inguinal hernia, right - patient reports diagnosis in ER in March, 2015     Black stools - possible melena     Hematochezia - short-lived and resolved, September, 2016     Epigastric abdominal tenderness - 9/13/16     Adenomatous polyps - seen on colonoscopy of 10/28/16 - repeat colonoscopy on or about 10/28/19.     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     Past Surgical History:   Procedure Laterality Date     APPENDECTOMY  2012     NE PART REMOVAL COLON W ANASTOMOSIS      Description: Partial Colectomy;  Recorded: 11/06/2013;  Comments: SIGMOID COLECTOMY, OCT 2013     Spontaneous Pnemothroax  1996     Current Outpatient Prescriptions on File Prior to Visit   Medication Sig Dispense Refill     ibuprofen (ADVIL,MOTRIN) 200 MG tablet Take 800 mg by mouth every 8 (eight) hours as needed for pain.       MELOXICAM ORAL Take by mouth.       aspirin 81 MG EC tablet Take 1 tablet (81 mg total) by mouth daily. 81 MG PO DAILY 100 tablet 3     blood glucose test strips Use 200 each As Directed 2 (two) times a day. 200 strip 0     blood sugar diagnostic Strp Test three times daily 100 strip 3     cholecalciferol, vitamin D3, 5,000 unit Tab Take 5,000 Units by mouth daily.       generic lancets (ACCU-CHEK FASTCLIX) TEST BLOOD SUGARS  each 1     HYDROcodone-acetaminophen 5-325 mg per tablet Take 1 tablet by mouth every 6 (six) hours as needed. 10 tablet 0     omeprazole (PRILOSEC) 20 MG capsule Take 20 mg by mouth daily.       No current facility-administered medications on file prior to visit.      No Known Allergies    I have reviewed the  patient's medical history in detail and updated the computerized patient record.  OBJECTIVE:  Wt Readings from Last 3 Encounters:   10/30/18 184 lb 3.2 oz (83.6 kg)   10/22/18 183 lb 12.8 oz (83.4 kg)   06/05/18 203 lb 11.2 oz (92.4 kg)     Temp Readings from Last 3 Encounters:   10/30/18 98.1  F (36.7  C) (Oral)   10/22/18 97.8  F (36.6  C) (Oral)   06/05/18 97.7  F (36.5  C) (Oral)     BP Readings from Last 3 Encounters:   10/30/18 114/60   10/22/18 122/60   06/05/18 100/62     Pulse Readings from Last 3 Encounters:   10/30/18 (!) 56   10/22/18 (!) 55   06/05/18 (!) 52     Body mass index is 26.43 kg/(m^2).     Alert, cooperative, well-hydrated. Appears well.  Eyes: Pupils equal, round, reactive to light.  HEENT: Sclera white, nares patent, MMM, TM's pearly bilaterally  Neck: supple, without lymphadenopathy, Thyroid freely movable and without hypotrophy or nodularity.   Lungs: Clear to auscultation. No retractions, no increased work of respiration, equal chest rise.   Heart: Regular rate and rhythm, no murmurs, clicks,   Gallops.  Abdomen: Soft, bowel sounds in 4 quadrants with no tenderness to palpation, no organomegaly or masses, no aortic or renal bruits.  Extremities: no tenderness to palpation of gastrocnemius, bilaterally.  Skin: no increased warmth, edema, or erythema of lower legs bilaterally.  Back: No cervical, thoracic or lumbar tenderness to spinous processes or musculature.  Neuro::pupils equal and reactive to light bilaterally, CN II - XII grossly intact. No focal motor/sensory deficits. DTR 2/4 all 4 extremities. Muscle Strength 5/5 all extremities, Rhomberg negative  Labs:  No visits with results within 3 Month(s) from this visit.  Latest known visit with results is:    Physical on 06/05/2018   Component Date Value     VENTRICULAR RATE 06/05/2018 50      ATRIAL RATE 06/05/2018 50      P-R INTERVAL 06/05/2018 130      QRS DURATION 06/05/2018 100      Q-T INTERVAL 06/05/2018 434      QTC CALCULATION  (BEZET) 06/05/2018 395      P [place] 06/05/2018 28      R AXIS 06/05/2018 18      T AXIS 06/05/2018 17      MUSE DIAGNOSIS 06/05/2018                      Value:Sinus bradycardia  Otherwise normal ECG  When compared with ECG of 04-NOV-2015 17:04,  No significant change was found  Confirmed by TAMAR JETT MD LOC:JN ([id number]) on 6/5/2018 2:51:25 PM       Hemoglobin A1c 06/05/2018 5.5      Microalbumin, Random Uri* 06/05/2018 <0.50      Creatinine, Urine 06/05/2018 107.6      Microalbumin/Creatinine * 06/05/2018       ASSESMENT/PLAN:  No diagnosis found.  Jessica Euceda, MS, PA-C 10/30/18

## 2021-06-21 NOTE — PROGRESS NOTES
Osmel Garay is a 53 y.o. male  Patient seen for upper back cyst removal. Cyst is increased in size and tender, located on midline upper back. He has no fever or malaise and no history of MRSA abscesses.  Sensory Status per patient report distally is intact.  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  Examination  /70  Pulse 76  Temp 98  F (36.7  C) (Oral)   Wt 179 lb 8 oz (81.4 kg)  SpO2 98%  BMI 25.76 kg/m2    Neuro Oriented x3, appropriate  Dermatologic Dry, warm, no increased erythema or exudates of the cyst area on thoracic back.  CardioPulmonary No Respiratory or Cardiac Stress  The cyst was 2 cm in diameter and located on the thoracic back to right of vertebral area.    Procedure Note  Anesthesia was obtained by 2% Xylocaine with epi.   The area was prepped with betadine and draped in the usual sterile fashion.  Cyst was opened with 10 scalpel. Sebaceous material and cystic sack were removed.   The skin of the wound was closed with 1 mattress stitche of 5-0 Nylon.  Sterile pressure dressing and Bacitracin were applied  1. Sebaceous cyst  Culture, Wound     Discussed SOI, Wd Care, scarring, healing, Follow up, etc.  Jessica Euceda, MS, PA-C

## 2021-06-21 NOTE — PROGRESS NOTES
Scapula xray is normal, please call results to the patient or send a letter if not reachable by phone.

## 2021-06-21 NOTE — PROGRESS NOTES
HPI:  Osmel Garay is a 53 y.o. male who is seen for   Chief Complaint   Patient presents with     Follow-up     cyst follow up, stitches removal   .  Patient denies chest pain, palpitations, shortness of breath, wheezing, cough, neck pain, back pain, dysuria, flank pain, abdominal pain, nausea, vomiting, diarrhea, constipation, black or bloody stools, acid reflux, lower leg edema, claudication, muscle weakness, dizziness, headaches, change in vision, changes in hearing, tinnitus, nasal congestion, fever, weight loss, globus, dysphagia, increased urination, increased thirst, depression, anxiety.  Lab Results   Component Value Date    HGBA1C 5.5 06/05/2018    HGBA1C 7.3 (H) 06/07/2017    HGBA1C 5.5 08/29/2016     Lab Results   Component Value Date    MICROALBUR <0.50 06/05/2018    LDLCALC 120 08/29/2016    CREATININE 1.03 06/07/2017     Patient Active Problem List   Diagnosis     Seborrheic Keratosis     Shoulder Strain     Joint Pain, Localized In The Shoulder     Esophageal Reflux     Diverticulitis of colon     Attention-deficit Hyperactivity Disorder     Plantar Fasciitis     DM - Type 2 diabetes      Inguinal hernia, right - patient reports diagnosis in ER in March, 2015     Black stools - possible melena     Hematochezia - short-lived and resolved, September, 2016     Epigastric abdominal tenderness - 9/13/16     Adenomatous polyps - seen on colonoscopy of 10/28/16 - repeat colonoscopy on or about 10/28/19.     Dental Pain - seen in ED on 12/4/16     Family History   Problem Relation Age of Onset     Rheum arthritis Mother      COPD Mother      Cancer Father      Diabetes Brother         Type 2 DM     No Medical Problems Sister      No Medical Problems Daughter      No Medical Problems Son      No Medical Problems Daughter      No Medical Problems Daughter      Rheum arthritis Brother      Diabetes Sister         Type 2 DM     Social History     Socioeconomic History     Marital status:      Spouse name:  None     Number of children: None     Years of education: None     Highest education level: None   Social Needs     Financial resource strain: None     Food insecurity - worry: None     Food insecurity - inability: None     Transportation needs - medical: None     Transportation needs - non-medical: None   Occupational History     Occupation: Computer work     Employer: [employer]   Tobacco Use     Smoking status: Current Every Day Smoker     Packs/day: 0.50     Last attempt to quit: 2016     Years since quittin.0     Smokeless tobacco: Never Used   Substance and Sexual Activity     Alcohol use: Yes     Alcohol/week: 0.6 oz     Types: 1 Shots of liquor per week     Drug use: No     Sexual activity: Yes     Partners: Female   Other Topics Concern     None   Social History Narrative    .     Past Surgical History:   Procedure Laterality Date     APPENDECTOMY       TN PART REMOVAL COLON W ANASTOMOSIS      Description: Partial Colectomy;  Recorded: 2013;  Comments: SIGMOID COLECTOMY, OCT 2013     Spontaneous Pnemothroax       Current Outpatient Medications on File Prior to Visit   Medication Sig Dispense Refill     aspirin 81 MG EC tablet Take 1 tablet (81 mg total) by mouth daily. 81 MG PO DAILY 100 tablet 3     blood glucose test strips Use 200 each As Directed 2 (two) times a day. 200 strip 0     blood sugar diagnostic Strp Test three times daily 100 strip 3     cholecalciferol, vitamin D3, 5,000 unit Tab Take 5,000 Units by mouth daily.       generic lancets (ACCU-CHEK FASTCLIX) TEST BLOOD SUGARS  each 1     HYDROcodone-acetaminophen 5-325 mg per tablet Take 1 tablet by mouth every 6 (six) hours as needed. 10 tablet 0     ibuprofen (ADVIL,MOTRIN) 200 MG tablet Take 800 mg by mouth every 8 (eight) hours as needed for pain.       meloxicam (MOBIC) 15 MG tablet TK 1 T PO D WF  2     MELOXICAM ORAL Take by mouth.       omeprazole (PRILOSEC) 20 MG capsule Take 20 mg by mouth  daily.       No current facility-administered medications on file prior to visit.      No Known Allergies    I have reviewed the patient's medical history in detail and updated the computerized patient record.  OBJECTIVE:  Wt Readings from Last 3 Encounters:   11/13/18 181 lb (82.1 kg)   11/06/18 179 lb 8 oz (81.4 kg)   10/30/18 184 lb 3.2 oz (83.6 kg)     Temp Readings from Last 3 Encounters:   11/06/18 98  F (36.7  C) (Oral)   10/30/18 98.1  F (36.7  C) (Oral)   10/22/18 97.8  F (36.6  C) (Oral)     BP Readings from Last 3 Encounters:   11/13/18 96/68   11/06/18 108/70   10/30/18 114/60     Pulse Readings from Last 3 Encounters:   11/13/18 84   11/06/18 76   10/30/18 (!) 56     Body mass index is 25.97 kg/m .     Alert, cooperative, well-hydrated. Appears well.  Eyes: Pupils equal, round, reactive to light.  Skin: Cyst removal area is well-healed, no erythema, no increased edema, no exudates, 1 stitch removed after cleansing with alcohol.  Back: No cervical, thoracic or lumbar tenderness to spinous processes or musculature.  Chest wall/ lungs: Tender over cyst removal area and left scpular area, no tenderness to palpation of chest wall, equal chest rise, no retractions, clear to auscultation in upper lobes, light crackles in lower lobes bilaterally.  Labs:  Procedure visit on 11/06/2018   Component Date Value     Culture 11/06/2018 No Growth      ASSESMENT/PLAN:  1. Chronic scapular pain  XR Scapula Left    XR Scapula Left   2. Chest wall pain  XR Chest 2 Views   Test his previous history of lung collapse, will get a chest x-ray to verify that he does not have any new partial collapse.  The best plan for diabetes will call with results on chest x-ray.  Jessica Euceda, MS, PA-C 11/13/18

## 2021-06-26 NOTE — PROGRESS NOTES
Progress Notes by Tara Robbins MD at 10/22/2018 12:10 PM     Author: Tara Robbins MD Service: -- Author Type: Physician    Filed: 10/22/2018  7:11 PM Encounter Date: 10/22/2018 Status: Signed    : Tara Robbins MD (Physician)         Subjective:   Osmel Garay is a 53 y.o. male  Roomed by: Latha Redding    Accompanied by Spouse    Refills needed? No    Do you have any forms that need to be filled out? No      Chief Complaint   Patient presents with   ? poss lump on left side of spine     Pain x month. Wife noticed about 2 weeks ago.    Patient noticed some discomfort about 2 months ago, more on the left side. Then wife noticed an slowly enlarging lump on the right side of his upper back. Denies any CP or shortness of breath. Noticed that once in a while his right fingers with get numb. 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 He   Review of Systems  See HPI for ROS, otherwise balance of other systems negative    No Known Allergies    Current Outpatient Prescriptions:   ?  MELOXICAM ORAL, Take by mouth., Disp: , Rfl:   ?  aspirin 81 MG EC tablet, Take 1 tablet (81 mg total) by mouth daily. 81 MG PO DAILY, Disp: 100 tablet, Rfl: 3  ?  blood glucose test strips, Use 200 each As Directed 2 (two) times a day., Disp: 200 strip, Rfl: 0  ?  blood sugar diagnostic Strp, Test three times daily, Disp: 100 strip, Rfl: 3  ?  cholecalciferol, vitamin D3, 5,000 unit Tab, Take 5,000 Units by mouth daily., Disp: , Rfl:   ?  generic lancets (ACCU-CHEK FASTCLIX), TEST BLOOD SUGARS BID, Disp: 200 each, Rfl: 1  ?  HYDROcodone-acetaminophen 5-325 mg per tablet, Take 1 tablet by mouth every 6 (six) hours as needed., Disp: 10 tablet, Rfl: 0  ?  ibuprofen (ADVIL,MOTRIN) 200 MG tablet, Take 800 mg by mouth every 8 (eight) hours as needed for pain., Disp: , Rfl:   ?  omeprazole (PRILOSEC) 20 MG capsule, Take 20 mg by mouth daily., Disp: , Rfl:   Patient Active  Problem List   Diagnosis   ? Seborrheic Keratosis   ? Shoulder Strain   ? Joint Pain, Localized In The Shoulder   ? Esophageal Reflux   ? Diverticulitis of colon   ? Attention-deficit Hyperactivity Disorder   ? Plantar Fasciitis   ? DM - Type 2 diabetes    ? Inguinal hernia, right - patient reports diagnosis in ER in March, 2015   ? Black stools - possible melena   ? Hematochezia - short-lived and resolved, September, 2016   ? Epigastric abdominal tenderness - 9/13/16   ? Adenomatous polyps - seen on colonoscopy of 10/28/16 - repeat colonoscopy on or about 10/28/19.   ? Dental Pain - seen in ED on 12/4/16     Past Medical History:   Diagnosis Date   ? ADHD (attention deficit hyperactivity disorder)    ? Diabetes mellitus (H) 3/25/2015    ER BG >500   ? Diverticulitis    ? GERD (gastroesophageal reflux disease)    ? Pneumonia 2012   ? Seborrheic keratosis     - if none on file, see Problem List    Objective:     Vitals:    10/22/18 1342   BP: 122/60   Patient Site: Right Arm   Patient Position: Sitting   Cuff Size: Adult Regular   Pulse: (!) 55   Resp: 16   Temp: 97.8  F (36.6  C)   TempSrc: Oral   SpO2: 95%   Weight: 183 lb 12.8 oz (83.4 kg)   General General-patient is in no apparent distress  Skin-mid back just to the right of the spine notes a raised mass approximately 2.5 x 1.5 cm that is slightly erythematous but soft and nontender to palpate             Assessment - Plan     1. Mass on back  Discussed the patient that the mass felt like a lipoma but that it most likely needed to be excised.  Discussed that the removal could be done either by family medicine or if they were uncomfortable, by surgery.  - Nursing communication    At the conclusion of the encounter, assessment and plan were discussed.   All questions were answered.   The patient or guardian acknowledged understanding and was involved in the decision making regarding the overall care plan.    Patient Instructions     1. Keep your follow up  appointment  2. If you have any questions, call the clinic number - the number is answered 24/7    Understanding a Lipoma    A lipoma is a benign lump under the skin thats made of fat. Its not cancer. It feels soft like rubber when you press it, and in most cases it doesnt hurt. Some people have more than one. A lipoma grows slowly over time and doesnt cause many problems. Lipomas occur most often in adults from ages 40 to 60, and more often in men.  How to say it  Ly-POH-salina   What causes a lipoma?  The cause is not yet known. Experts are still learning more. It may be partly caused by a problem in a gene. They can run in families. Familial multiple lipomatosis is when 2 or more family members have many lipomas.  Symptoms of a lipoma  The main symptom of a lipoma is a soft lump under the skin that doesnt hurt unless it is pressing on a nerve. It may be small, around 1/4 inch across. Or it may be larger, up to 4 inches across or more.  There are different kinds of lipomas. The most common kind occurs under the skin of the shoulders, chest, back, belly, or under the arms. In some cases, a lipoma can occur on the legs. In rare cases, one may occur deeper in the body or in a muscle.  Treatment for a lipoma  In most cases, a lipoma doesnt need treatment. Your healthcare provider may look at it during regular checkups to see if it changes.  But if the lipoma is painful or you want it removed for cosmetic reasons, it can be removed with surgery. The surgery is called excision. The lipoma will most likely not grow back after surgery. During surgery, the area around the lipoma is numbed. If you have a deep lipoma, you may need medicine called regional anesthesia to numb a larger area. Or you may need medicine called general anesthesia to put you to sleep during the procedure. Then the doctor makes a cut over the area of the lipoma. He or she removes the lump of fat. The cut is then closed with stitches.  Possible  complications of a lipoma  A large lipoma inside the body can press on organs, nerves, or other tissues and cause problems. For example, it can cause problems with breathing or digestion.  Living with a lipoma  Your healthcare provider may look at the lipoma during regular checkups to see if it changes or is causing problems.  When to call your healthcare provider  Call your healthcare provider right away if you have any of these:    Lipoma that grows quickly, causes pain, or feels hard    Growth of new lipomas   Date Last Reviewed: 5/1/2016 2000-2017 [employer]. [address] [place], [place] [id number]. All rights reserved. This information is not intended as a substitute for professional medical care. Always follow your healthcare professional's instructions.

## 2021-06-28 NOTE — PROGRESS NOTES
Progress Notes by Sharon Reed MD at 12/6/2019  1:30 PM     Author: Sharon Reed MD Service: -- Author Type: Physician    Filed: 12/6/2019  2:05 PM Encounter Date: 12/6/2019 Status: Signed    : Sharon Reed MD (Physician)             [facility] Note    Assessment:  Osmle Garay is a 54 y.o. old male with a PMhx significant for DM2, GERD, diverticulosis who was admitted for chest pain from 10/23/19 to 10/26/19 with chest pain and underwent coronary angiogram 2/2 ST elevations on ECG and elevated troponin. He underwent urgent coronary angiogram which was significant for non-obstructive disease. CTA chest was negative for dissection. Echo was negative for significant abnormalities in cardiac structure or function. Given the pleuritic nature of his chest discomfort in the setting of non-obstructive CAD, ECG changes and positive enzymes, he was treated for pericarditis. Cardiac MRI was performed subsequently, and was negative for evidence of acute idiopathic myocarditis/pericarditis or myocardial infarction.     ECG: Personally reviewed. Diffuse ST elevations and ST depressions in AVR most consistent with pericarditis especially in light of patient's viral prodrome    ECHO (personnaly Reviewed):     Left Ventricle: Normal left ventricular size.The estimated left ventricular ejection fraction is 55%. This represents a normal ejection fraction. The left ventricular wall thickness is normal. E/e' 8 to 15, which is equivocal for estimating LV filling pressures.Left ventricular diastolic function is normal.    Resting Score Index: 1.00 The left ventricular wall motion is normal.    Right Ventricle: Normal right ventricular size and systolic function. TAPSE is normal, which is consistent with normal right ventricular systolic function.    IVC: Central venous pressure with IVC diameter greater than 2.1 cm and greater than 50% decrease during inspiration. Estimated  central venous pressure equal to 8 mmHg.    Pericardium: Trace pericardial effusion.    No previous study for comparison.    Coronary angiogram:    The left ventricular systolic function is normal.    The ejection fraction is calculated to be 60%.     Pt brought to cath lab after activation of STEMI protocol from field.  Left dominant circulation  Left main - normal  LAD - diffuse mild and moderate plaquing without focal severe stenosis  CX - large dominant vessel with moderate disease, but no focal severe lesions  RCA - small non-dominant  LVEF - normal    Cardiac MRI:  1. Normal left ventricular size, wall thickness and function. The quantified left ventricular ejection fraction is 68.5%. No myocardial scar is identified.   2. Normal right ventricular size function.   3. Moderately enlarged both atria.  4. No obvious valvular disease.  5. Normal thickness of pericardium. No pericardial effusion. No delayed gadolinium enhancement on pericardium.    No evidence of acute idiopathic myocarditis/pericarditis or myocardial infarction based on cardiac MRI images.    Plan:  - Discussed cardiac MRI findings in detail; patient completed course of colchicine and NSAID without incident, will continue to hold therapy especially in light of MRI findings  - Patient will follow-up with cardiology PRN    ______________________________________________________________________    Subjective:  CC: Today, Mr. Garay reports the he is currently chest pain free and has not had recurrence since his hospital discharge. He denies any symptoms of palpitations, dyspnea, nausea, vomiting, lightheadedness, presyncope/syncope. He denies any HF symptoms of orthopnea, PND or edema.     ______________________________________________________________________      Review of Systems:   A complete 10 systems ROS was reviewed  And is negative except what is listed in the HPI.     Problem List:  Patient Active Problem List   Diagnosis   ? Seborrheic  Keratosis   ? Shoulder Strain   ? Joint Pain, Localized In The Shoulder   ? Esophageal Reflux   ? Diverticulitis of colon   ? Attention deficit hyperactivity disorder (ADHD)   ? Plantar Fasciitis   ? DM - Type 2 diabetes    ? Inguinal hernia, right - patient reports diagnosis in ER in March, 2015   ? Black stools - possible melena   ? Hematochezia - short-lived and resolved, September, 2016   ? Epigastric abdominal tenderness - 9/13/16   ? Adenomatous polyps - seen on colonoscopy of 10/28/16 - repeat colonoscopy on or about 10/28/19.   ? Dental Pain - seen in ED on 12/4/16   ? Chest pain due to myocardial ischemia, unspecified ischemic chest pain type   ? Acute idiopathic pericarditis   ? Acute idiopathic myocarditis   ? Chest pain     Medical History:  Past Medical History:   Diagnosis Date   ? ADHD (attention deficit hyperactivity disorder)    ? Diabetes mellitus (H) 3/25/2015    ER BG >500   ? Diverticulitis    ? GERD (gastroesophageal reflux disease)    ? Pneumonia 2012   ? Seborrheic keratosis      Surgical History:  Past Surgical History:   Procedure Laterality Date   ? APPENDECTOMY  2012   ? Arthroscopic surgery left knee  5/2/19   ? CV CORONARY ANGIOGRAM N/A 10/23/2019    Procedure: Coronary Angiogram;  Surgeon: Romel Delgado MD;  Location: [facility] Cath Lab;  Service: Cardiology   ? CV LEFT HEART CATHETERIZATION WITH LEFT VENTRICULOGRAM N/A 10/23/2019    Procedure: Left Heart Catheterization with Left Ventriculogram;  Surgeon: Romel Delgado MD;  Location: [facility] Cath Lab;  Service: Cardiology   ? Left knee reconstruction     ? KS PART REMOVAL COLON W ANASTOMOSIS      Description: Partial Colectomy;  Recorded: 11/06/2013;  Comments: SIGMOID COLECTOMY, OCT 2013   ? Spontaneous Pnemothroax  1996     Social History:  Social History     Socioeconomic History   ? Marital status:      Spouse name: Not on file   ? Number of children: Not on file   ? Years of education: Not on file   ?  Highest education level: Not on file   Occupational History   ? Occupation: Computer work     Employer: [employer]   Social Needs   ? Financial resource strain: Not on file   ? Food insecurity:     Worry: Not on file     Inability: Not on file   ? Transportation needs:     Medical: Not on file     Non-medical: Not on file   Tobacco Use   ? Smoking status: Current Every Day Smoker     Packs/day: 0.50     Last attempt to quit: 11/13/2016     Years since quitting: 3.0   ? Smokeless tobacco: Never Used   Substance and Sexual Activity   ? Alcohol use: Yes     Alcohol/week: 1.0 standard drinks     Types: 1 Shots of liquor per week   ? Drug use: No   ? Sexual activity: Yes     Partners: Female   Lifestyle   ? Physical activity:     Days per week: Not on file     Minutes per session: Not on file   ? Stress: Not on file   Relationships   ? Social connections:     Talks on phone: Not on file     Gets together: Not on file     Attends [employer] service: Not on file     Active member of club or organization: Not on file     Attends meetings of clubs or organizations: Not on file     Relationship status: Not on file   ? Intimate partner violence:     Fear of current or ex partner: Not on file     Emotionally abused: Not on file     Physically abused: Not on file     Forced sexual activity: Not on file   Other Topics Concern   ? Not on file   Social History Narrative    .       Family History:  Family History   Problem Relation Age of Onset   ? Rheum arthritis Mother    ? COPD Mother    ? Cancer Father    ? Diabetes Brother         Type 2 DM   ? No Medical Problems Sister    ? No Medical Problems Daughter    ? No Medical Problems Son    ? No Medical Problems Daughter    ? No Medical Problems Daughter    ? Rheum arthritis Brother    ? Diabetes Sister         Type 2 DM         Allergies:  No Known Allergies    Medications:  Current Outpatient Medications   Medication Sig Dispense Refill   ? omeprazole (PRILOSEC) 20  "MG capsule Take 20 mg by mouth as needed.       No current facility-administered medications for this visit.      Facility-Administered Medications Ordered in Other Visits   Medication Dose Route Frequency Provider Last Rate Last Dose   ? acetaminophen tablet 500 mg (TYLENOL)  500 mg Oral Q4H PRN Romel Delgado MD       ? morphine injection 1-2 mg  1-2 mg Intravenous Q3H PRN Romel Delgado MD       ? oxyCODONE immediate release tablet 5-10 mg (ROXICODONE)  5-10 mg Oral Q4H PRN Romel Delgado MD           Objective:   Vital signs:  /70 (Patient Site: Right Arm, Patient Position: Sitting, Cuff Size: Adult Regular)   Pulse 72   Resp 16   Ht 5' 10\" (1.778 m)   Wt 204 lb (92.5 kg)   BMI 29.27 kg/m        Physical Exam:    GENERAL APPEARANCE: Alert, cooperative and in no acute distress.   HEENT: No scleral icterus. Oral mucuos membranes pink and moist.   NECK: no JVD. No Hepatojugular reflux. Thyroid not visualized. No lymphadenopathy   CHEST: clear to auscultation   CARDIOVASCULAR: S1, S2 without murmur ,clicks or rubs. Brachial, radial and posterior tibial pulses are intact and symetric. No carotid bruits noted. No edema  ABDOMEN: Nontender. BS+. No bruits.   SKIN: No Xanthelasma   Musculoskeletal: No cyanosis, clubbing or swelling.      Lab Results:  LIPIDS:  Lab Results   Component Value Date    CHOL 205 (H) 08/29/2016    CHOL 163 05/08/2015    CHOL 142 03/26/2015     Lab Results   Component Value Date    HDL 30 (L) 08/29/2016    HDL 29 (L) 05/08/2015    HDL 21 (L) 03/26/2015     Lab Results   Component Value Date    LDLCALC 120 08/29/2016    LDLCALC 100 05/08/2015    LDLCALC  03/26/2015      Comment:      Invalid, Triglycerides >300     Lab Results   Component Value Date    TRIG 275 (H) 08/29/2016    TRIG 171 (H) 05/08/2015    TRIG 477 (H) 03/26/2015     No components found for: CHOLHDL    BMP:  Lab Results   Component Value Date    CREATININE 1.24 10/23/2019    BUN 26 (H) 10/23/2019 "     10/23/2019    K 3.9 10/23/2019     10/23/2019    CO2 23 10/23/2019         Susana Reed MD., S  [facility]

## 2021-07-03 NOTE — ADDENDUM NOTE
Addendum Note by Stef Euceda PA-C at 11/6/2018 11:14 AM     Author: Stef Euceda PA-C Service: -- Author Type: Physician Assistant    Filed: 11/6/2018 11:14 AM Encounter Date: 11/6/2018 Status: Signed    : Stef Euceda PA-C (Physician Assistant)    Addended by: STEF EUCEDA on: 11/6/2018 11:14 AM        Modules accepted: Orders

## 2021-07-04 ENCOUNTER — HEALTH MAINTENANCE LETTER (OUTPATIENT)
Age: 56
End: 2021-07-04

## 2021-10-24 ENCOUNTER — HEALTH MAINTENANCE LETTER (OUTPATIENT)
Age: 56
End: 2021-10-24

## 2022-01-26 ENCOUNTER — LAB REQUISITION (OUTPATIENT)
Dept: LAB | Facility: CLINIC | Age: 57
End: 2022-01-26

## 2022-01-26 DIAGNOSIS — Z12.5 ENCOUNTER FOR SCREENING FOR MALIGNANT NEOPLASM OF PROSTATE: ICD-10-CM

## 2022-01-26 DIAGNOSIS — Z13.220 ENCOUNTER FOR SCREENING FOR LIPOID DISORDERS: ICD-10-CM

## 2022-01-26 DIAGNOSIS — E11.9 TYPE 2 DIABETES MELLITUS WITHOUT COMPLICATIONS (H): ICD-10-CM

## 2022-01-26 DIAGNOSIS — R10.11 RIGHT UPPER QUADRANT PAIN: ICD-10-CM

## 2022-01-26 LAB
ALBUMIN SERPL-MCNC: 4.1 G/DL (ref 3.5–5)
ALP SERPL-CCNC: 71 U/L (ref 45–120)
ALT SERPL W P-5'-P-CCNC: 21 U/L (ref 0–45)
ANION GAP SERPL CALCULATED.3IONS-SCNC: 10 MMOL/L (ref 5–18)
AST SERPL W P-5'-P-CCNC: 16 U/L (ref 0–40)
BILIRUB SERPL-MCNC: 0.4 MG/DL (ref 0–1)
BUN SERPL-MCNC: 16 MG/DL (ref 8–22)
CALCIUM SERPL-MCNC: 8.9 MG/DL (ref 8.5–10.5)
CHLORIDE BLD-SCNC: 108 MMOL/L (ref 98–107)
CHOLEST SERPL-MCNC: 210 MG/DL
CO2 SERPL-SCNC: 22 MMOL/L (ref 22–31)
CREAT SERPL-MCNC: 1.02 MG/DL (ref 0.7–1.3)
ERYTHROCYTE [DISTWIDTH] IN BLOOD BY AUTOMATED COUNT: 12.1 % (ref 10–15)
GFR SERPL CREATININE-BSD FRML MDRD: 87 ML/MIN/1.73M2
GLUCOSE BLD-MCNC: 113 MG/DL (ref 70–125)
HCT VFR BLD AUTO: 44.1 % (ref 40–53)
HDLC SERPL-MCNC: 34 MG/DL
HGB BLD-MCNC: 15.1 G/DL (ref 13.3–17.7)
LDLC SERPL CALC-MCNC: 124 MG/DL
MCH RBC QN AUTO: 30.7 PG (ref 26.5–33)
MCHC RBC AUTO-ENTMCNC: 34.2 G/DL (ref 31.5–36.5)
MCV RBC AUTO: 90 FL (ref 78–100)
PLATELET # BLD AUTO: 236 10E3/UL (ref 150–450)
POTASSIUM BLD-SCNC: 4.2 MMOL/L (ref 3.5–5)
PROT SERPL-MCNC: 6.9 G/DL (ref 6–8)
PSA SERPL-MCNC: 0.77 UG/L (ref 0–3.5)
RBC # BLD AUTO: 4.92 10E6/UL (ref 4.4–5.9)
SODIUM SERPL-SCNC: 140 MMOL/L (ref 136–145)
TRIGL SERPL-MCNC: 262 MG/DL
WBC # BLD AUTO: 8.1 10E3/UL (ref 4–11)

## 2022-01-26 PROCEDURE — G0103 PSA SCREENING: HCPCS | Performed by: NURSE PRACTITIONER

## 2022-01-26 PROCEDURE — 80061 LIPID PANEL: CPT | Performed by: NURSE PRACTITIONER

## 2022-01-26 PROCEDURE — 80053 COMPREHEN METABOLIC PANEL: CPT | Performed by: NURSE PRACTITIONER

## 2022-01-26 PROCEDURE — 85027 COMPLETE CBC AUTOMATED: CPT | Performed by: NURSE PRACTITIONER

## 2022-01-26 PROCEDURE — 82040 ASSAY OF SERUM ALBUMIN: CPT | Performed by: NURSE PRACTITIONER

## 2022-02-13 ENCOUNTER — HEALTH MAINTENANCE LETTER (OUTPATIENT)
Age: 57
End: 2022-02-13

## 2022-07-31 ENCOUNTER — HEALTH MAINTENANCE LETTER (OUTPATIENT)
Age: 57
End: 2022-07-31

## 2022-10-13 ENCOUNTER — LAB REQUISITION (OUTPATIENT)
Dept: LAB | Facility: CLINIC | Age: 57
End: 2022-10-13

## 2022-10-13 DIAGNOSIS — N52.9 MALE ERECTILE DYSFUNCTION, UNSPECIFIED: ICD-10-CM

## 2022-10-13 LAB — SHBG SERPL-SCNC: 19 NMOL/L (ref 11–80)

## 2022-10-13 PROCEDURE — 84403 ASSAY OF TOTAL TESTOSTERONE: CPT | Performed by: NURSE PRACTITIONER

## 2022-10-13 PROCEDURE — 84270 ASSAY OF SEX HORMONE GLOBUL: CPT | Performed by: NURSE PRACTITIONER

## 2022-10-15 LAB
TESTOST FREE SERPL-MCNC: 7.28 NG/DL
TESTOST SERPL-MCNC: 281 NG/DL (ref 240–950)

## 2022-10-16 ENCOUNTER — HEALTH MAINTENANCE LETTER (OUTPATIENT)
Age: 57
End: 2022-10-16

## 2023-06-01 ENCOUNTER — HEALTH MAINTENANCE LETTER (OUTPATIENT)
Age: 58
End: 2023-06-01

## 2023-08-11 ENCOUNTER — LAB REQUISITION (OUTPATIENT)
Dept: LAB | Facility: CLINIC | Age: 58
End: 2023-08-11

## 2023-08-11 DIAGNOSIS — E78.5 HYPERLIPIDEMIA, UNSPECIFIED: ICD-10-CM

## 2023-08-11 DIAGNOSIS — E11.9 TYPE 2 DIABETES MELLITUS WITHOUT COMPLICATIONS (H): ICD-10-CM

## 2023-08-11 LAB
ALBUMIN SERPL BCG-MCNC: 4.4 G/DL (ref 3.5–5.2)
ALP SERPL-CCNC: 79 U/L (ref 40–129)
ALT SERPL W P-5'-P-CCNC: 18 U/L (ref 0–70)
ANION GAP SERPL CALCULATED.3IONS-SCNC: 13 MMOL/L (ref 7–15)
AST SERPL W P-5'-P-CCNC: 15 U/L (ref 0–45)
BILIRUB SERPL-MCNC: 0.3 MG/DL
BUN SERPL-MCNC: 17.9 MG/DL (ref 6–20)
CALCIUM SERPL-MCNC: 9 MG/DL (ref 8.6–10)
CHLORIDE SERPL-SCNC: 103 MMOL/L (ref 98–107)
CHOLEST SERPL-MCNC: 198 MG/DL
CREAT SERPL-MCNC: 1.23 MG/DL (ref 0.67–1.17)
DEPRECATED HCO3 PLAS-SCNC: 21 MMOL/L (ref 22–29)
GFR SERPL CREATININE-BSD FRML MDRD: 68 ML/MIN/1.73M2
GLUCOSE SERPL-MCNC: 203 MG/DL (ref 70–99)
HDLC SERPL-MCNC: 31 MG/DL
LDLC SERPL CALC-MCNC: 96 MG/DL
NONHDLC SERPL-MCNC: 167 MG/DL
POTASSIUM SERPL-SCNC: 4.3 MMOL/L (ref 3.4–5.3)
PROT SERPL-MCNC: 6.7 G/DL (ref 6.4–8.3)
SODIUM SERPL-SCNC: 137 MMOL/L (ref 136–145)
TRIGL SERPL-MCNC: 357 MG/DL

## 2023-08-11 PROCEDURE — 80053 COMPREHEN METABOLIC PANEL: CPT | Performed by: NURSE PRACTITIONER

## 2023-08-11 PROCEDURE — 80061 LIPID PANEL: CPT | Performed by: NURSE PRACTITIONER

## 2023-08-26 ENCOUNTER — HEALTH MAINTENANCE LETTER (OUTPATIENT)
Age: 58
End: 2023-08-26

## 2023-12-22 ENCOUNTER — APPOINTMENT (OUTPATIENT)
Dept: CT IMAGING | Facility: HOSPITAL | Age: 58
End: 2023-12-22
Attending: FAMILY MEDICINE
Payer: COMMERCIAL

## 2023-12-22 ENCOUNTER — HOSPITAL ENCOUNTER (EMERGENCY)
Facility: HOSPITAL | Age: 58
Discharge: HOME | End: 2023-12-22
Attending: FAMILY MEDICINE | Admitting: FAMILY MEDICINE
Payer: COMMERCIAL

## 2023-12-22 VITALS
WEIGHT: 224 LBS | HEART RATE: 75 BPM | TEMPERATURE: 98 F | DIASTOLIC BLOOD PRESSURE: 81 MMHG | BODY MASS INDEX: 32.14 KG/M2 | SYSTOLIC BLOOD PRESSURE: 123 MMHG | RESPIRATION RATE: 19 BRPM | OXYGEN SATURATION: 96 %

## 2023-12-22 DIAGNOSIS — B02.9 HERPES ZOSTER WITHOUT COMPLICATION: ICD-10-CM

## 2023-12-22 LAB
ALBUMIN SERPL BCG-MCNC: 4.4 G/DL (ref 3.5–5.2)
ALBUMIN UR-MCNC: NEGATIVE MG/DL
ALP SERPL-CCNC: 73 U/L (ref 40–150)
ALT SERPL W P-5'-P-CCNC: 17 U/L (ref 0–70)
ANION GAP SERPL CALCULATED.3IONS-SCNC: 11 MMOL/L (ref 7–15)
APPEARANCE UR: CLEAR
AST SERPL W P-5'-P-CCNC: 16 U/L (ref 0–45)
BASOPHILS # BLD AUTO: 0.1 10E3/UL (ref 0–0.2)
BASOPHILS NFR BLD AUTO: 1 %
BILIRUB DIRECT SERPL-MCNC: <0.2 MG/DL (ref 0–0.3)
BILIRUB SERPL-MCNC: 0.4 MG/DL
BILIRUB UR QL STRIP: NEGATIVE
BUN SERPL-MCNC: 19.2 MG/DL (ref 6–20)
CALCIUM SERPL-MCNC: 9.3 MG/DL (ref 8.6–10)
CHLORIDE SERPL-SCNC: 104 MMOL/L (ref 98–107)
COLOR UR AUTO: COLORLESS
CREAT SERPL-MCNC: 0.83 MG/DL (ref 0.67–1.17)
DEPRECATED HCO3 PLAS-SCNC: 21 MMOL/L (ref 22–29)
EGFRCR SERPLBLD CKD-EPI 2021: >90 ML/MIN/1.73M2
EOSINOPHIL # BLD AUTO: 0.2 10E3/UL (ref 0–0.7)
EOSINOPHIL NFR BLD AUTO: 3 %
ERYTHROCYTE [DISTWIDTH] IN BLOOD BY AUTOMATED COUNT: 12.3 % (ref 10–15)
GLUCOSE SERPL-MCNC: 212 MG/DL (ref 70–99)
GLUCOSE UR STRIP-MCNC: >1000 MG/DL
HCT VFR BLD AUTO: 45.6 % (ref 40–53)
HGB BLD-MCNC: 16 G/DL (ref 13.3–17.7)
HGB UR QL STRIP: NEGATIVE
IMM GRANULOCYTES # BLD: 0.1 10E3/UL
IMM GRANULOCYTES NFR BLD: 1 %
KETONES UR STRIP-MCNC: NEGATIVE MG/DL
LEUKOCYTE ESTERASE UR QL STRIP: NEGATIVE
LIPASE SERPL-CCNC: 48 U/L (ref 13–60)
LYMPHOCYTES # BLD AUTO: 1.4 10E3/UL (ref 0.8–5.3)
LYMPHOCYTES NFR BLD AUTO: 17 %
MCH RBC QN AUTO: 30.8 PG (ref 26.5–33)
MCHC RBC AUTO-ENTMCNC: 35.1 G/DL (ref 31.5–36.5)
MCV RBC AUTO: 88 FL (ref 78–100)
MONOCYTES # BLD AUTO: 0.5 10E3/UL (ref 0–1.3)
MONOCYTES NFR BLD AUTO: 7 %
NEUTROPHILS # BLD AUTO: 6 10E3/UL (ref 1.6–8.3)
NEUTROPHILS NFR BLD AUTO: 71 %
NITRATE UR QL: NEGATIVE
NRBC # BLD AUTO: 0 10E3/UL
NRBC BLD AUTO-RTO: 0 /100
PH UR STRIP: 5 [PH] (ref 5–7)
PLATELET # BLD AUTO: 189 10E3/UL (ref 150–450)
POTASSIUM SERPL-SCNC: 4.2 MMOL/L (ref 3.4–5.3)
PROT SERPL-MCNC: 6.9 G/DL (ref 6.4–8.3)
RBC # BLD AUTO: 5.2 10E6/UL (ref 4.4–5.9)
RBC URINE: <1 /HPF
SODIUM SERPL-SCNC: 136 MMOL/L (ref 135–145)
SP GR UR STRIP: 1.01 (ref 1–1.03)
UROBILINOGEN UR STRIP-MCNC: <2 MG/DL
WBC # BLD AUTO: 8.4 10E3/UL (ref 4–11)
WBC URINE: 0 /HPF

## 2023-12-22 PROCEDURE — 83690 ASSAY OF LIPASE: CPT | Performed by: FAMILY MEDICINE

## 2023-12-22 PROCEDURE — 81001 URINALYSIS AUTO W/SCOPE: CPT | Performed by: FAMILY MEDICINE

## 2023-12-22 PROCEDURE — 82248 BILIRUBIN DIRECT: CPT | Performed by: FAMILY MEDICINE

## 2023-12-22 PROCEDURE — 99285 EMERGENCY DEPT VISIT HI MDM: CPT | Mod: 25

## 2023-12-22 PROCEDURE — 250N000011 HC RX IP 250 OP 636: Performed by: FAMILY MEDICINE

## 2023-12-22 PROCEDURE — 74177 CT ABD & PELVIS W/CONTRAST: CPT

## 2023-12-22 PROCEDURE — 36415 COLL VENOUS BLD VENIPUNCTURE: CPT | Performed by: FAMILY MEDICINE

## 2023-12-22 PROCEDURE — 85025 COMPLETE CBC W/AUTO DIFF WBC: CPT | Performed by: FAMILY MEDICINE

## 2023-12-22 PROCEDURE — 96374 THER/PROPH/DIAG INJ IV PUSH: CPT | Mod: 59

## 2023-12-22 PROCEDURE — 250N000011 HC RX IP 250 OP 636: Mod: JZ | Performed by: FAMILY MEDICINE

## 2023-12-22 RX ORDER — GABAPENTIN 300 MG/1
300 CAPSULE ORAL 3 TIMES DAILY
Qty: 30 CAPSULE | Refills: 0 | Status: SHIPPED | OUTPATIENT
Start: 2023-12-22

## 2023-12-22 RX ORDER — VALACYCLOVIR HYDROCHLORIDE 1 G/1
1000 TABLET, FILM COATED ORAL 3 TIMES DAILY
Qty: 21 TABLET | Refills: 0 | Status: SHIPPED | OUTPATIENT
Start: 2023-12-22 | End: 2023-12-29

## 2023-12-22 RX ORDER — KETOROLAC TROMETHAMINE 15 MG/ML
15 INJECTION, SOLUTION INTRAMUSCULAR; INTRAVENOUS ONCE
Status: COMPLETED | OUTPATIENT
Start: 2023-12-22 | End: 2023-12-22

## 2023-12-22 RX ORDER — IOPAMIDOL 755 MG/ML
90 INJECTION, SOLUTION INTRAVASCULAR ONCE
Status: COMPLETED | OUTPATIENT
Start: 2023-12-22 | End: 2023-12-22

## 2023-12-22 RX ORDER — OXYCODONE HYDROCHLORIDE 5 MG/1
5 TABLET ORAL ONCE
Status: DISCONTINUED | OUTPATIENT
Start: 2023-12-22 | End: 2023-12-22 | Stop reason: HOSPADM

## 2023-12-22 RX ORDER — OXYCODONE HYDROCHLORIDE 5 MG/1
5 TABLET ORAL EVERY 4 HOURS PRN
Qty: 12 TABLET | Refills: 0 | Status: SHIPPED | OUTPATIENT
Start: 2023-12-22 | End: 2023-12-26

## 2023-12-22 RX ORDER — PREDNISONE 10 MG/1
TABLET ORAL
Qty: 22 TABLET | Refills: 0 | Status: SHIPPED | OUTPATIENT
Start: 2023-12-22 | End: 2024-01-02

## 2023-12-22 RX ORDER — POLYETHYLENE GLYCOL 3350 17 G/17G
238 POWDER, FOR SOLUTION ORAL ONCE
Status: DISCONTINUED | OUTPATIENT
Start: 2023-12-22 | End: 2023-12-22

## 2023-12-22 RX ADMIN — IOPAMIDOL 90 ML: 755 INJECTION, SOLUTION INTRAVENOUS at 09:16

## 2023-12-22 RX ADMIN — KETOROLAC TROMETHAMINE 15 MG: 15 INJECTION, SOLUTION INTRAMUSCULAR; INTRAVENOUS at 08:35

## 2023-12-22 ASSESSMENT — ACTIVITIES OF DAILY LIVING (ADL): ADLS_ACUITY_SCORE: 35

## 2023-12-22 ASSESSMENT — ENCOUNTER SYMPTOMS
VOMITING: 0
SHORTNESS OF BREATH: 0
APPETITE CHANGE: 1
FREQUENCY: 1
NAUSEA: 0

## 2023-12-22 NOTE — ED TRIAGE NOTES
Patient with right sided flank pain and RUQ pain that started yesterday.  Had telehealth visit and dx with shingles.  Patient does have rash over the right flank area.  Denies any N/V.  Denies any urinary symptoms.  No changes to BM.      Triage Assessment (Adult)       Row Name 12/22/23 0736          Triage Assessment    Airway WDL WDL        Respiratory WDL    Respiratory WDL WDL        Skin Circulation/Temperature WDL    Skin Circulation/Temperature WDL WDL        Cardiac WDL    Cardiac WDL WDL        Peripheral/Neurovascular WDL    Peripheral Neurovascular WDL WDL        Cognitive/Neuro/Behavioral WDL    Cognitive/Neuro/Behavioral WDL WDL

## 2023-12-22 NOTE — ED PROVIDER NOTES
EMERGENCY DEPARTMENT ENCOUNTER      NAME: Osmel Garay  AGE: 58 year old male  YOB: 1965  MRN: [id number]  EVALUATION DATE & TIME: 12/22/2023  7:39 AM    PCP: No primary care provider on file.    ED PROVIDER: Chirag Sarmiento M.D.    Chief Complaint   Patient presents with    Flank Pain    Abdominal Pain       FINAL IMPRESSION:  1. Herpes zoster without complication        ED COURSE & MEDICAL DECISION MAKING:    Pertinent Labs & Imaging studies independently interpreted by me. (See chart for details)  7:50 AM  Patient seen and examined, reviewed most recent urology note from May 2023, given options for erectile dysfunction treatment, no urinary concerns at that time and no history of kidney stones.  Patient presents today with abdominal pain and flank pain.  Recently diagnosed with shingles and does have vesicular rash in the distribution where his pain is in the right flank and right upper quadrant.  However he also has some abdominal tenderness and some sensation of bloating in the right upper quadrant.  Symptoms may be all related to shingles cannot exclude intra-abdominal pathology based on exam, labs and CT scan ordered to evaluate further intra-abdominal pathology including acute cholecystitis, pancreatitis, colitis or diverticulitis.  Toradol ordered for pain.  9:34 AM labs ordered and independently interpreted by me negative for acute findings with normal CBC, normal hepatic panel, normal basic metabolic panel.  CT scan of the abdomen pelvis independently interpreted by me negative for acute findings.  Patient will be started on steroid and antiviral as well as pain medication and can be discharged.  Updated patient with diagnosis and plan.  We reviewed his lab results in detail including glucose in the urine and hyperglycemia.    At the conclusion of the encounter I discussed the results of all of the tests and the disposition. The questions were answered. The patient or family acknowledged  understanding and was agreeable with the care plan.     Medical Decision Making    History:  Supplemental history from: Documented in chart, if applicable  External Record(s) reviewed: Documented in chart, if applicable.    Work Up:  Chart documentation includes differential considered and any EKGs or imaging independently interpreted by provider, where specified.  In additional to work up documented, I considered the following work up: Documented in chart, if applicable.    External consultation:  Discussion of management with another provider: Documented in chart, if applicable    Complicating factors:  Care impacted by chronic illness: Diabetes  Care affected by social determinants of health: N/A    Disposition considerations: Discharge. I prescribed additional prescription strength medication(s) as charted. N/A.    MEDICATIONS GIVEN IN THE EMERGENCY:  Medications   oxyCODONE (ROXICODONE) tablet 5 mg (has no administration in time range)   ketorolac (TORADOL) injection 15 mg (15 mg Intravenous $Given 12/22/23 0835)   iopamidol (ISOVUE-370) solution 90 mL (90 mLs Intravenous $Given 12/22/23 0916)       NEW PRESCRIPTIONS STARTED AT TODAY'S ER VISIT  New Prescriptions    GABAPENTIN (NEURONTIN) 300 MG CAPSULE    Take 1 capsule (300 mg) by mouth 3 times daily    OXYCODONE (ROXICODONE) 5 MG TABLET    Take 1 tablet (5 mg) by mouth every 4 hours as needed If pain is not improved with acetaminophen and ibuprofen.    PREDNISONE (DELTASONE) 10 MG TABLET    Take 4 tablets (40 mg) by mouth daily for 3 days, THEN 2 tablets (20 mg) daily for 3 days, THEN 1 tablet (10 mg) daily for 3 days, THEN 0.5 tablets (5 mg) daily for 2 days.    VALACYCLOVIR (VALTREX) 1000 MG TABLET    Take 1 tablet (1,000 mg) by mouth 3 times daily for 7 days       =================================================================    HPI    Patient information was obtained from: the patient      Osmel Garay is a 58 year old male with a pertinent history of  "type 2 diabetes who presents to this ED by walk-in for evaluation of flank pain, abdominal pain and a rash.     The patient reports that he developed pain in his right flank on 12/17-12/18 which is now radiating into his right upper quadrant, inner right thigh and right arm. He describes the pain as \"sharp\", as if \"I'm being hit by a baseball bat\". Additionally, he noticed a rash on the right side of his lower back that developed on the night of 12/21. He also reports having a decreased appetite and increased urinary frequency. The patient denies vomiting or having nausea or shortness of breath. He is taking Metformin for his diabetes. He is not on blood thinners. The patient endorses personal use of tobacco and occasionally drinks alcohol. He has had a prior appendectomy and states that part of his colon was removed due to diverticulitis.      REVIEW OF SYSTEMS   Review of Systems   Constitutional:  Positive for appetite change.   Respiratory:  Negative for shortness of breath.    Gastrointestinal:  Negative for nausea and vomiting.   Genitourinary:  Positive for frequency.   Musculoskeletal:         Positive for right flank pain radiating into right upper quadrant, right arm and right inner thigh.    Skin:         Positive for rash on right lower back.      All other systems reviewed and negative    PAST MEDICAL HISTORY:  History reviewed. No pertinent past medical history.    PAST SURGICAL HISTORY:  Past Surgical History:   Procedure Laterality Date    APPENDECTOMY  2012    CV CORONARY ANGIOGRAM N/A 10/23/2019    Procedure: Coronary Angiogram;  Surgeon: Romel Delgado MD;  Location: [facility] Cath Lab;  Service: Cardiology    CV LEFT HEART CATHETERIZATION WITH LEFT VENTRICULOGRAM N/A 10/23/2019    Procedure: Left Heart Catheterization with Left Ventriculogram;  Surgeon: Romel Delgado MD;  Location: [facility] Cath Lab;  Service: Cardiology    OTHER SURGICAL HISTORY  1996    Spontaneous " Pnemothroax    OTHER SURGICAL HISTORY      Left knee reconstruction    OTHER SURGICAL HISTORY  19    Arthroscopic surgery left knee    ZZC PART REMOVAL COLON W ANASTOMOSIS      Description: Partial Colectomy;  Recorded: 2013;  Comments: SIGMOID COLECTOMY, OCT 2013       CURRENT MEDICATIONS:    Current Facility-Administered Medications   Medication    oxyCODONE (ROXICODONE) tablet 5 mg     Current Outpatient Medications   Medication    gabapentin (NEURONTIN) 300 MG capsule    oxyCODONE (ROXICODONE) 5 MG tablet    predniSONE (DELTASONE) 10 MG tablet    valACYclovir (VALTREX) 1000 mg tablet    ibuprofen (ADVIL,MOTRIN) 200 MG tablet    omeprazole (PRILOSEC) 20 MG capsule       ALLERGIES:  No Known Allergies    FAMILY HISTORY:  Family History   Problem Relation Age of Onset    Rheumatoid Arthritis Mother     Chronic Obstructive Pulmonary Disease Mother     Cancer Father     Diabetes Brother         Type 2 DM    No Known Problems Sister     No Known Problems Daughter     No Known Problems Son     No Known Problems Daughter     No Known Problems Daughter     Rheumatoid Arthritis Brother     Diabetes Sister         Type 2 DM       SOCIAL HISTORY:   Social History     Socioeconomic History    Marital status:    Tobacco Use    Smoking status: Every Day     Packs/day: .5     Types: Cigarettes     Last attempt to quit: 2016     Years since quittin.1    Smokeless tobacco: Never   Substance and Sexual Activity    Alcohol use: Yes     Alcohol/week: 1.0 standard drink of alcohol    Drug use: No    Sexual activity: Yes     Partners: Female   Social History Narrative    .       VITALS:  BP (!) 157/82   Pulse 83   Temp 98  F (36.7  C) (Temporal)   Resp 19   Wt 101.6 kg (224 lb)   SpO2 94%   BMI 32.14 kg/m      PHYSICAL EXAM:  Physical Exam  Vitals and nursing note reviewed.   Constitutional:       Appearance: Normal appearance.   HENT:      Head: Normocephalic and atraumatic.      Right Ear:  External ear normal.      Left Ear: External ear normal.      Nose: Nose normal.      Mouth/Throat:      Mouth: Mucous membranes are moist.   Eyes:      Extraocular Movements: Extraocular movements intact.      Conjunctiva/sclera: Conjunctivae normal.      Pupils: Pupils are equal, round, and reactive to light.   Cardiovascular:      Rate and Rhythm: Normal rate and regular rhythm.   Pulmonary:      Effort: Pulmonary effort is normal.      Breath sounds: Normal breath sounds. No wheezing or rales.   Abdominal:      General: Abdomen is flat. There is no distension.      Palpations: Abdomen is soft.      Tenderness: There is abdominal tenderness in the right upper quadrant and right lower quadrant. There is no guarding.   Musculoskeletal:         General: Normal range of motion.      Cervical back: Normal range of motion and neck supple.      Right lower leg: No edema.      Left lower leg: No edema.   Lymphadenopathy:      Cervical: No cervical adenopathy.   Skin:     General: Skin is warm and dry.      Comments: Small fluid-filled vesicles on erythematous spaces with a dermatomal pattern on the right side of the patient's back.    Neurological:      General: No focal deficit present.      Mental Status: He is alert and oriented to person, place, and time. Mental status is at baseline.      Comments: No gross focal neurologic deficits   Psychiatric:         Mood and Affect: Mood normal.         Behavior: Behavior normal.         Thought Content: Thought content normal.          LAB:  All pertinent labs reviewed and interpreted.  Results for orders placed or performed during the hospital encounter of 12/22/23   CT Abdomen Pelvis w Contrast    Impression    IMPRESSION:   1.  No abnormalities are seen to explain symptoms.  2.  Specifically, no inflammatory changes, renal calculi or obstruction.  3.  Sigmoid staple line, 2 cm fecalith within the blind pouch and scattered distal colonic diverticulosis again noted.  4.   Hepatic steatosis.  5.  Prior appendectomy.   6.  There are trace, bilateral pleural effusions without signs of failure or pneumonia. That on  the left noted on the 2019 CT as well.   Basic metabolic panel   Result Value Ref Range    Sodium 136 135 - 145 mmol/L    Potassium 4.2 3.4 - 5.3 mmol/L    Chloride 104 98 - 107 mmol/L    Carbon Dioxide (CO2) 21 (L) 22 - 29 mmol/L    Anion Gap 11 7 - 15 mmol/L    Urea Nitrogen 19.2 6.0 - 20.0 mg/dL    Creatinine 0.83 0.67 - 1.17 mg/dL    GFR Estimate >90 >60 mL/min/1.73m2    Calcium 9.3 8.6 - 10.0 mg/dL    Glucose 212 (H) 70 - 99 mg/dL   Hepatic function panel   Result Value Ref Range    Protein Total 6.9 6.4 - 8.3 g/dL    Albumin 4.4 3.5 - 5.2 g/dL    Bilirubin Total 0.4 <=1.2 mg/dL    Alkaline Phosphatase 73 40 - 150 U/L    AST 16 0 - 45 U/L    ALT 17 0 - 70 U/L    Bilirubin Direct <0.20 0.00 - 0.30 mg/dL   Result Value Ref Range    Lipase 48 13 - 60 U/L   UA with Microscopic reflex to Culture    Specimen: Urine, Midstream   Result Value Ref Range    Color Urine Colorless Colorless, Straw, Light Yellow, Yellow    Appearance Urine Clear Clear    Glucose Urine >1000 (A) Negative mg/dL    Bilirubin Urine Negative Negative    Ketones Urine Negative Negative mg/dL    Specific Gravity Urine 1.015 1.001 - 1.030    Blood Urine Negative Negative    pH Urine 5.0 5.0 - 7.0    Protein Albumin Urine Negative Negative mg/dL    Urobilinogen Urine <2.0 <2.0 mg/dL    Nitrite Urine Negative Negative    Leukocyte Esterase Urine Negative Negative    RBC Urine <1 <=2 /HPF    WBC Urine 0 <=5 /HPF   CBC with platelets and differential   Result Value Ref Range    WBC Count 8.4 4.0 - 11.0 10e3/uL    RBC Count 5.20 4.40 - 5.90 10e6/uL    Hemoglobin 16.0 13.3 - 17.7 g/dL    Hematocrit 45.6 40.0 - 53.0 %    MCV 88 78 - 100 fL    MCH 30.8 26.5 - 33.0 pg    MCHC 35.1 31.5 - 36.5 g/dL    RDW 12.3 10.0 - 15.0 %    Platelet Count 189 150 - 450 10e3/uL    % Neutrophils 71 %    % Lymphocytes 17 %    %  Monocytes 7 %    % Eosinophils 3 %    % Basophils 1 %    % Immature Granulocytes 1 %    NRBCs per 100 WBC 0 <1 /100    Absolute Neutrophils 6.0 1.6 - 8.3 10e3/uL    Absolute Lymphocytes 1.4 0.8 - 5.3 10e3/uL    Absolute Monocytes 0.5 0.0 - 1.3 10e3/uL    Absolute Eosinophils 0.2 0.0 - 0.7 10e3/uL    Absolute Basophils 0.1 0.0 - 0.2 10e3/uL    Absolute Immature Granulocytes 0.1 <=0.4 10e3/uL    Absolute NRBCs 0.0 10e3/uL       RADIOLOGY:  Reviewed all pertinent imaging. Please see official radiology report.  CT Abdomen Pelvis w Contrast   Final Result   IMPRESSION:    1.  No abnormalities are seen to explain symptoms.   2.  Specifically, no inflammatory changes, renal calculi or obstruction.   3.  Sigmoid staple line, 2 cm fecalith within the blind pouch and scattered distal colonic diverticulosis again noted.   4.  Hepatic steatosis.   5.  Prior appendectomy.    6.  There are trace, bilateral pleural effusions without signs of failure or pneumonia. That on  the left noted on the 2019 CT as well.          I, Marisabel Hoover, am serving as a scribe to document services personally performed by Dr. Sarmiento based on my observation and the provider's statements to me. I, Chirag Sarmiento MD attest that Marisabel Hoover is acting in a scribe capacity, has observed my performance of the services and has documented them in accordance with my direction.    Chirag Sarmiento M.D.  Emergency Medicine  [facility] EMERGENCY DEPARTMENT  [address] [id number] [phone]  Dept: [phone]       Chirag Sarmiento MD  12/22/23 6721

## 2023-12-22 NOTE — Clinical Note
Osmel Garay was seen and treated in our emergency department on 12/22/2023.  He may return to work on 12/25/2023.       If you have any questions or concerns, please don't hesitate to call.      Chirag Sarmiento MD

## 2024-01-13 ENCOUNTER — HEALTH MAINTENANCE LETTER (OUTPATIENT)
Age: 59
End: 2024-01-13

## 2024-02-02 ENCOUNTER — LAB REQUISITION (OUTPATIENT)
Dept: LAB | Facility: CLINIC | Age: 59
End: 2024-02-02

## 2024-02-02 DIAGNOSIS — Z12.5 ENCOUNTER FOR SCREENING FOR MALIGNANT NEOPLASM OF PROSTATE: ICD-10-CM

## 2024-02-02 PROCEDURE — G0103 PSA SCREENING: HCPCS | Performed by: NURSE PRACTITIONER

## 2024-02-03 LAB — PSA SERPL DL<=0.01 NG/ML-MCNC: 0.88 NG/ML (ref 0–3.5)

## 2024-08-10 ENCOUNTER — HEALTH MAINTENANCE LETTER (OUTPATIENT)
Age: 59
End: 2024-08-10

## 2024-10-19 ENCOUNTER — HEALTH MAINTENANCE LETTER (OUTPATIENT)
Age: 59
End: 2024-10-19

## 2025-01-07 ENCOUNTER — LAB REQUISITION (OUTPATIENT)
Dept: LAB | Facility: CLINIC | Age: 60
End: 2025-01-07

## 2025-01-07 DIAGNOSIS — E78.5 HYPERLIPIDEMIA, UNSPECIFIED: ICD-10-CM

## 2025-01-07 DIAGNOSIS — E11.9 TYPE 2 DIABETES MELLITUS WITHOUT COMPLICATIONS (H): ICD-10-CM

## 2025-01-07 PROCEDURE — 80061 LIPID PANEL: CPT | Performed by: NURSE PRACTITIONER

## 2025-01-07 PROCEDURE — 80048 BASIC METABOLIC PNL TOTAL CA: CPT | Performed by: NURSE PRACTITIONER

## 2025-01-07 PROCEDURE — 82043 UR ALBUMIN QUANTITATIVE: CPT | Performed by: NURSE PRACTITIONER

## 2025-01-07 PROCEDURE — 83721 ASSAY OF BLOOD LIPOPROTEIN: CPT | Performed by: NURSE PRACTITIONER

## 2025-01-08 LAB
ANION GAP SERPL CALCULATED.3IONS-SCNC: 15 MMOL/L (ref 7–15)
BUN SERPL-MCNC: 17 MG/DL (ref 6–20)
CALCIUM SERPL-MCNC: 9.2 MG/DL (ref 8.8–10.4)
CHLORIDE SERPL-SCNC: 100 MMOL/L (ref 98–107)
CHOLEST SERPL-MCNC: 239 MG/DL
CREAT SERPL-MCNC: 1.06 MG/DL (ref 0.67–1.17)
CREAT UR-MCNC: 43.7 MG/DL
EGFRCR SERPLBLD CKD-EPI 2021: 81 ML/MIN/1.73M2
FASTING STATUS PATIENT QL REPORTED: ABNORMAL
FASTING STATUS PATIENT QL REPORTED: ABNORMAL
GLUCOSE SERPL-MCNC: 205 MG/DL (ref 70–99)
HCO3 SERPL-SCNC: 21 MMOL/L (ref 22–29)
HDLC SERPL-MCNC: 29 MG/DL
LDLC SERPL CALC-MCNC: ABNORMAL MG/DL
LDLC SERPL DIRECT ASSAY-MCNC: 146 MG/DL
MICROALBUMIN UR-MCNC: <12 MG/L
MICROALBUMIN/CREAT UR: NORMAL MG/G{CREAT}
NONHDLC SERPL-MCNC: 210 MG/DL
POTASSIUM SERPL-SCNC: 3.8 MMOL/L (ref 3.4–5.3)
SODIUM SERPL-SCNC: 136 MMOL/L (ref 135–145)
TRIGL SERPL-MCNC: 524 MG/DL

## 2025-02-22 ENCOUNTER — HEALTH MAINTENANCE LETTER (OUTPATIENT)
Age: 60
End: 2025-02-22

## 2025-04-10 ENCOUNTER — TRANSFERRED RECORDS (OUTPATIENT)
Dept: HEALTH INFORMATION MANAGEMENT | Facility: CLINIC | Age: 60
End: 2025-04-10
Payer: COMMERCIAL

## 2025-04-10 LAB — HBA1C MFR BLD: 7.7 % (ref 4.2–6.1)

## 2025-04-28 ENCOUNTER — OFFICE VISIT (OUTPATIENT)
Dept: PHARMACY | Facility: PHYSICIAN GROUP | Age: 60
End: 2025-04-28
Payer: COMMERCIAL

## 2025-04-28 DIAGNOSIS — E11.9 TYPE 2 DIABETES MELLITUS WITHOUT COMPLICATION, WITHOUT LONG-TERM CURRENT USE OF INSULIN (H): Primary | ICD-10-CM

## 2025-04-28 PROCEDURE — 99207 PR NO CHARGE LOS: CPT | Performed by: PHARMACIST

## 2025-04-28 RX ORDER — SEMAGLUTIDE 0.68 MG/ML
0.5 INJECTION, SOLUTION SUBCUTANEOUS
COMMUNITY
Start: 2025-04-21

## 2025-04-28 RX ORDER — SIMVASTATIN 20 MG
20 TABLET ORAL AT BEDTIME
COMMUNITY

## 2025-04-28 RX ORDER — ACYCLOVIR 400 MG/1
TABLET ORAL
COMMUNITY
Start: 2025-04-17

## 2025-04-28 RX ORDER — ASPIRIN 81 MG/1
81 TABLET, CHEWABLE ORAL DAILY
COMMUNITY
Start: 2025-04-10

## 2025-04-28 NOTE — PROGRESS NOTES
Clinical Pharmacy Consult:                                                    Osmel Garay is a 60 year old male coming in for a clinical pharmacist consult.  He was referred to me from MARIAN Musa.     Reason for Consult: New Dexcom start    Discussion:     Initiated Dexcom G7 CGM system:    - Provided instructions for sensor application and yousuf setup    - Educated on functionality, readings, trends, and alerts    - Configured alerts: high alert 300 mg/dL, disabled non-essential alerts    - Connected to clinic ([facility]) for remote monitoring  - CGM education:    - 15-minute delay between interstitial and blood glucose levels    - Use finger stick tests for immediate post-meal checks or if CGM seems inaccurate    - Change sensor every 10 days, alternating arms or adjusting placement    - Discussed potential skin reactions and when to remove sensor  - Monitor Time in Range (TIR): target 70% between  mg/dL  - Refill sensors through pharmacy as needed    Plan:  1. Start using Dexcom G7 CGM  2. Change sensor every 10 days      Sri Garber, PharmD, BCACP  Medication Therapy Management Pharmacist  [facility]  [phone]

## 2025-07-29 ENCOUNTER — LAB REQUISITION (OUTPATIENT)
Dept: LAB | Facility: CLINIC | Age: 60
End: 2025-07-29

## 2025-07-29 DIAGNOSIS — E11.9 TYPE 2 DIABETES MELLITUS WITHOUT COMPLICATIONS (H): ICD-10-CM

## 2025-07-29 DIAGNOSIS — E78.5 HYPERLIPIDEMIA, UNSPECIFIED: ICD-10-CM

## 2025-07-29 PROCEDURE — 84075 ASSAY ALKALINE PHOSPHATASE: CPT | Performed by: NURSE PRACTITIONER

## 2025-07-29 PROCEDURE — 80061 LIPID PANEL: CPT | Performed by: NURSE PRACTITIONER

## 2025-07-30 LAB
ALBUMIN SERPL BCG-MCNC: 4.2 G/DL (ref 3.5–5.2)
ALP SERPL-CCNC: 67 U/L (ref 40–150)
ALT SERPL W P-5'-P-CCNC: 20 U/L (ref 0–70)
ANION GAP SERPL CALCULATED.3IONS-SCNC: 13 MMOL/L (ref 7–15)
AST SERPL W P-5'-P-CCNC: 16 U/L (ref 0–45)
BILIRUB SERPL-MCNC: 0.2 MG/DL
BUN SERPL-MCNC: 20.2 MG/DL (ref 8–23)
CALCIUM SERPL-MCNC: 9.5 MG/DL (ref 8.8–10.4)
CHLORIDE SERPL-SCNC: 105 MMOL/L (ref 98–107)
CHOLEST SERPL-MCNC: 190 MG/DL
CREAT SERPL-MCNC: 0.97 MG/DL (ref 0.67–1.17)
EGFRCR SERPLBLD CKD-EPI 2021: 90 ML/MIN/1.73M2
FASTING STATUS PATIENT QL REPORTED: NO
FASTING STATUS PATIENT QL REPORTED: NO
GLUCOSE SERPL-MCNC: 223 MG/DL (ref 70–99)
HCO3 SERPL-SCNC: 19 MMOL/L (ref 22–29)
HDLC SERPL-MCNC: 32 MG/DL
LDLC SERPL CALC-MCNC: 84 MG/DL
NONHDLC SERPL-MCNC: 158 MG/DL
POTASSIUM SERPL-SCNC: 4.1 MMOL/L (ref 3.4–5.3)
PROT SERPL-MCNC: 6.6 G/DL (ref 6.4–8.3)
SODIUM SERPL-SCNC: 137 MMOL/L (ref 135–145)
TRIGL SERPL-MCNC: 368 MG/DL